# Patient Record
Sex: FEMALE | Race: WHITE | Employment: UNEMPLOYED | URBAN - METROPOLITAN AREA
[De-identification: names, ages, dates, MRNs, and addresses within clinical notes are randomized per-mention and may not be internally consistent; named-entity substitution may affect disease eponyms.]

---

## 2019-02-24 ENCOUNTER — HOSPITAL ENCOUNTER (EMERGENCY)
Age: 39
Discharge: HOME | End: 2019-02-24
Attending: EMERGENCY MEDICINE
Payer: MEDICAID

## 2019-02-24 ENCOUNTER — APPOINTMENT (OUTPATIENT)
Dept: GENERAL RADIOLOGY | Age: 39
End: 2019-02-24
Payer: MEDICAID

## 2019-02-24 VITALS
BODY MASS INDEX: 45.32 KG/M2 | SYSTOLIC BLOOD PRESSURE: 112 MMHG | TEMPERATURE: 98.4 F | HEIGHT: 60 IN | OXYGEN SATURATION: 94 % | RESPIRATION RATE: 18 BRPM | WEIGHT: 230.82 LBS | HEART RATE: 110 BPM | DIASTOLIC BLOOD PRESSURE: 70 MMHG

## 2019-02-24 DIAGNOSIS — R11.2 NON-INTRACTABLE VOMITING WITH NAUSEA, UNSPECIFIED VOMITING TYPE: ICD-10-CM

## 2019-02-24 DIAGNOSIS — J11.1 INFLUENZA WITH RESPIRATORY MANIFESTATION OTHER THAN PNEUMONIA: Primary | ICD-10-CM

## 2019-02-24 DIAGNOSIS — R07.81 RIB PAIN: ICD-10-CM

## 2019-02-24 LAB
BILIRUBIN URINE: ABNORMAL
BLOOD, URINE: NEGATIVE
CLARITY: CLEAR
COLOR: ABNORMAL
GLUCOSE URINE: NEGATIVE MG/DL
HCG(URINE) PREGNANCY TEST: NEGATIVE
KETONES, URINE: ABNORMAL MG/DL
LEUKOCYTE ESTERASE, URINE: NEGATIVE
MICROSCOPIC EXAMINATION: ABNORMAL
NITRITE, URINE: NEGATIVE
PH UA: 5
PROTEIN UA: NEGATIVE MG/DL
RAPID INFLUENZA  B AGN: NEGATIVE
RAPID INFLUENZA A AGN: POSITIVE
SPECIFIC GRAVITY UA: >=1.03
URINE REFLEX TO CULTURE: ABNORMAL
URINE TYPE: ABNORMAL
UROBILINOGEN, URINE: 1 E.U./DL

## 2019-02-24 PROCEDURE — 6370000000 HC RX 637 (ALT 250 FOR IP): Performed by: EMERGENCY MEDICINE

## 2019-02-24 PROCEDURE — 94760 N-INVAS EAR/PLS OXIMETRY 1: CPT

## 2019-02-24 PROCEDURE — 71046 X-RAY EXAM CHEST 2 VIEWS: CPT

## 2019-02-24 PROCEDURE — 87804 INFLUENZA ASSAY W/OPTIC: CPT

## 2019-02-24 PROCEDURE — 6360000002 HC RX W HCPCS: Performed by: EMERGENCY MEDICINE

## 2019-02-24 PROCEDURE — 84703 CHORIONIC GONADOTROPIN ASSAY: CPT

## 2019-02-24 PROCEDURE — 94664 DEMO&/EVAL PT USE INHALER: CPT

## 2019-02-24 PROCEDURE — 94640 AIRWAY INHALATION TREATMENT: CPT

## 2019-02-24 PROCEDURE — 81003 URINALYSIS AUTO W/O SCOPE: CPT

## 2019-02-24 PROCEDURE — 99284 EMERGENCY DEPT VISIT MOD MDM: CPT

## 2019-02-24 RX ORDER — GUAIFENESIN/DEXTROMETHORPHAN 100-10MG/5
5 SYRUP ORAL ONCE
Status: COMPLETED | OUTPATIENT
Start: 2019-02-24 | End: 2019-02-24

## 2019-02-24 RX ORDER — ALBUTEROL SULFATE 2.5 MG/3ML
5 SOLUTION RESPIRATORY (INHALATION) ONCE
Status: COMPLETED | OUTPATIENT
Start: 2019-02-24 | End: 2019-02-24

## 2019-02-24 RX ORDER — ONDANSETRON 4 MG/1
4 TABLET, ORALLY DISINTEGRATING ORAL ONCE
Status: COMPLETED | OUTPATIENT
Start: 2019-02-24 | End: 2019-02-24

## 2019-02-24 RX ORDER — NAPROXEN 250 MG/1
500 TABLET ORAL 2 TIMES DAILY WITH MEALS
Qty: 28 TABLET | Refills: 0 | Status: SHIPPED | OUTPATIENT
Start: 2019-02-24 | End: 2019-04-29 | Stop reason: ALTCHOICE

## 2019-02-24 RX ORDER — ONDANSETRON 4 MG/1
4 TABLET, ORALLY DISINTEGRATING ORAL EVERY 8 HOURS PRN
Qty: 20 TABLET | Refills: 0 | Status: SHIPPED | OUTPATIENT
Start: 2019-02-24

## 2019-02-24 RX ORDER — ALBUTEROL SULFATE 90 UG/1
2 AEROSOL, METERED RESPIRATORY (INHALATION) EVERY 4 HOURS PRN
Qty: 1 INHALER | Refills: 0 | Status: SHIPPED | OUTPATIENT
Start: 2019-02-24

## 2019-02-24 RX ORDER — GUAIFENESIN/DEXTROMETHORPHAN 100-10MG/5
5 SYRUP ORAL 3 TIMES DAILY PRN
Qty: 120 ML | Refills: 0 | Status: SHIPPED | OUTPATIENT
Start: 2019-02-24 | End: 2019-03-06

## 2019-02-24 RX ORDER — NAPROXEN 250 MG/1
500 TABLET ORAL ONCE
Status: COMPLETED | OUTPATIENT
Start: 2019-02-24 | End: 2019-02-24

## 2019-02-24 RX ADMIN — ALBUTEROL SULFATE 5 MG: 2.5 SOLUTION RESPIRATORY (INHALATION) at 15:36

## 2019-02-24 RX ADMIN — NAPROXEN 500 MG: 250 TABLET ORAL at 16:12

## 2019-02-24 RX ADMIN — ONDANSETRON 4 MG: 4 TABLET, ORALLY DISINTEGRATING ORAL at 16:09

## 2019-02-24 RX ADMIN — GUAIFENESIN AND DEXTROMETHORPHAN 5 ML: 100; 10 SYRUP ORAL at 16:04

## 2019-02-24 ASSESSMENT — PAIN DESCRIPTION - LOCATION: LOCATION: RIB CAGE

## 2019-02-24 ASSESSMENT — PAIN SCALES - GENERAL
PAINLEVEL_OUTOF10: 8
PAINLEVEL_OUTOF10: 8

## 2019-02-24 ASSESSMENT — PAIN DESCRIPTION - ORIENTATION: ORIENTATION: RIGHT

## 2019-04-29 ENCOUNTER — OFFICE VISIT (OUTPATIENT)
Dept: FAMILY MEDICINE CLINIC | Age: 39
End: 2019-04-29
Payer: MEDICAID

## 2019-04-29 VITALS
BODY MASS INDEX: 44.47 KG/M2 | OXYGEN SATURATION: 98 % | DIASTOLIC BLOOD PRESSURE: 72 MMHG | HEART RATE: 97 BPM | WEIGHT: 226.5 LBS | HEIGHT: 60 IN | TEMPERATURE: 98.1 F | SYSTOLIC BLOOD PRESSURE: 126 MMHG

## 2019-04-29 DIAGNOSIS — R11.2 NON-INTRACTABLE VOMITING WITH NAUSEA, UNSPECIFIED VOMITING TYPE: ICD-10-CM

## 2019-04-29 DIAGNOSIS — R49.0 HOARSENESS: ICD-10-CM

## 2019-04-29 DIAGNOSIS — E03.9 HYPOTHYROIDISM, UNSPECIFIED TYPE: Primary | ICD-10-CM

## 2019-04-29 DIAGNOSIS — F31.60 BIPOLAR AFFECTIVE DISORDER, CURRENT EPISODE MIXED, CURRENT EPISODE SEVERITY UNSPECIFIED (HCC): ICD-10-CM

## 2019-04-29 DIAGNOSIS — J45.51 SEVERE PERSISTENT ASTHMA WITH ACUTE EXACERBATION: ICD-10-CM

## 2019-04-29 DIAGNOSIS — R19.7 DIARRHEA, UNSPECIFIED TYPE: ICD-10-CM

## 2019-04-29 PROCEDURE — 99204 OFFICE O/P NEW MOD 45 MIN: CPT | Performed by: INTERNAL MEDICINE

## 2019-04-29 PROCEDURE — G8417 CALC BMI ABV UP PARAM F/U: HCPCS | Performed by: INTERNAL MEDICINE

## 2019-04-29 PROCEDURE — G8427 DOCREV CUR MEDS BY ELIG CLIN: HCPCS | Performed by: INTERNAL MEDICINE

## 2019-04-29 PROCEDURE — 4004F PT TOBACCO SCREEN RCVD TLK: CPT | Performed by: INTERNAL MEDICINE

## 2019-04-29 RX ORDER — LEVOTHYROXINE SODIUM 0.03 MG/1
25 TABLET ORAL DAILY
Qty: 30 TABLET | Refills: 5 | Status: SHIPPED | OUTPATIENT
Start: 2019-04-29 | End: 2019-11-25 | Stop reason: SDUPTHER

## 2019-04-29 RX ORDER — PREDNISONE 20 MG/1
TABLET ORAL
Qty: 13 TABLET | Refills: 0 | Status: SHIPPED | OUTPATIENT
Start: 2019-04-29 | End: 2019-05-09 | Stop reason: ALTCHOICE

## 2019-04-29 RX ORDER — OMEPRAZOLE 40 MG/1
40 CAPSULE, DELAYED RELEASE ORAL
Qty: 30 CAPSULE | Refills: 0 | Status: SHIPPED | OUTPATIENT
Start: 2019-04-29 | End: 2019-06-08 | Stop reason: SDUPTHER

## 2019-04-29 RX ORDER — BUDESONIDE AND FORMOTEROL FUMARATE DIHYDRATE 160; 4.5 UG/1; UG/1
2 AEROSOL RESPIRATORY (INHALATION) 2 TIMES DAILY
Qty: 1 INHALER | Refills: 5 | Status: SHIPPED | OUTPATIENT
Start: 2019-04-29

## 2019-04-29 ASSESSMENT — PATIENT HEALTH QUESTIONNAIRE - PHQ9
1. LITTLE INTEREST OR PLEASURE IN DOING THINGS: 1
SUM OF ALL RESPONSES TO PHQ QUESTIONS 1-9: 2
2. FEELING DOWN, DEPRESSED OR HOPELESS: 1
SUM OF ALL RESPONSES TO PHQ9 QUESTIONS 1 & 2: 2
SUM OF ALL RESPONSES TO PHQ QUESTIONS 1-9: 2

## 2019-04-29 NOTE — PROGRESS NOTES
Prasanth Renata   1980      Reason for visit:   Chief Complaint   Patient presents with   [facility]     Has been off of her psych meds for months    URI        HPI:  Patient presents to establish care. She reports she had the flu 2 months ago. She has felt poorly since that time. She has having intermittent episodes of emesis and diarrhea. No blood noted. No blood noted. She feels weak. Her throat is constantly sore and she feels hoarse. She has heartburn but states it is not major. No significant abdominal pain. She reports very low energy. She has a h/o asthma. She has been using her inhaler 2-3 times per day. She feels sob and wheezy. She states she has been off of a controller for a long time - does not recall the name. She reports she was at [facility] ER last week and was given IVF, steroids. She did not see much improvement with the steroids. She has a h/o hypothyroidism and is not on medication. Her tsh was 5.2 on 4/20 when in the ED. She is a current smoker ~3/4 ppd. She has a h/o anxiety and bipolar affective disorder. She reports she was on medication previously but her insurance changed and she stopped seeing them. She shala any acute issues with depression or anxiety. She knows she needs to see a psychiatrist but doesn't feel like its a major issue right now. She is interested in establishing with a new psychiatrist as she did not have a good relationship with her last doctor. She does not remember what medications she was on at that time. .     Past Medical History:   Diagnosis Date    Anxiety     Asthma     Bipolar affective ([facility] [address]     Depression     Hypothyroidism     Influenza A 02/24/2019           Current Outpatient Medications:     levothyroxine (SYNTHROID) 25 MCG tablet, Take 1 tablet by mouth daily, Disp: 30 tablet, Rfl: 5    omeprazole (PRILOSEC) 40 MG delayed release capsule, Take 1 capsule by mouth every morning (before breakfast), Disp: 30 capsule, Rfl: 0   budesonide-formoterol (SYMBICORT) 160-4.5 MCG/ACT AERO, Inhale 2 puffs into the lungs 2 times daily, Disp: 1 Inhaler, Rfl: 5    predniSONE (DELTASONE) 20 MG tablet, Please take 2 tablets x 4 days, 1 tablet x 3 days, 1/2 tablet x 3, Disp: 13 tablet, Rfl: 0    ondansetron (ZOFRAN ODT) 4 MG disintegrating tablet, Take 1 tablet by mouth every 8 hours as needed for Nausea or Vomiting, Disp: 20 tablet, Rfl: 0    albuterol sulfate HFA (PROAIR HFA) 108 (90 Base) MCG/ACT inhaler, Inhale 2 puffs into the lungs every 4 hours as needed for Wheezing, Disp: 1 Inhaler, Rfl: 0     No Known Allergies    Past Surgical History:   Procedure Laterality Date     SECTION  2007     SECTION  2009     SECTION  2010     SECTION  2013    HERNIA REPAIR      Multiple Hernia repairs    TYMPANOSTOMY TUBE PLACEMENT      As a child        Family History   Problem Relation Age of Onset    Mental Illness Mother         Social History     Socioeconomic History    Marital status:      Spouse name: Not on file    Number of children: Not on file    Years of education: Not on file    Highest education level: Not on file   Occupational History    Not on file   Social Needs    Financial resource strain: Not on file    Food insecurity:     Worry: Not on file     Inability: Not on file    Transportation needs:     Medical: Not on file     Non-medical: Not on file   Tobacco Use    Smoking status: Current Every Day Smoker     Packs/day: 1.00    Smokeless tobacco: Current User   Substance and Sexual Activity    Alcohol use: Yes     Comment: occ beer    Drug use: Not on file    Sexual activity: Not on file   Lifestyle    Physical activity:     Days per week: Not on file     Minutes per session: Not on file    Stress: Not on file   Relationships    Social connections:     Talks on phone: Not on file     Gets together: Not on file     Attends Hindu service: Not on file Active member of club or organization: Not on file     Attends meetings of clubs or organizations: Not on file     Relationship status: Not on file    Intimate partner violence:     Fear of current or ex partner: Not on file     Emotionally abused: Not on file     Physically abused: Not on file     Forced sexual activity: Not on file   Other Topics Concern    Not on file   Social History Narrative    Not on file       Review of Systems   Constitutional: Negative for chills, fever and unexpected weight change. +fatigue  HENT: Negative for congestion, ear pain,  trouble swallowing. +hoarseness    Eyes: Negative for pain and redness. Respiratory: positive for cough and shortness of breath. Cardiovascular: Negative for chest pain, palpitations and leg swelling. Gastrointestinal: Negative for abdominal pain, blood in stool, constipation, positive for diarrhea, nausea and vomiting. Endocrine: Negative for cold intolerance, heat intolerance, polydipsia and polyuria. Genitourinary: Negative for dysuria, frequency and hematuria. Musculoskeletal: Negative for arthralgias, myalgias and joint swelling. Skin: Negative for rash. Neurological: Negative for weakness, numbness and headaches. Hematological: Negative for adenopathy. Does not bruise/bleed easily. Psychiatric/Behavioral: Negative for sleep disturbance. The patient is not nervous/anxious. No report of depression    Physical Exam:  /72   Pulse 97   Temp 98.1 °F (36.7 °C) (Oral)   Ht 5' (1.524 m)   Wt 226 lb 8 oz (102.7 kg)   LMP 04/17/2019   SpO2 98%   BMI 44.24 kg/m²   Constitutional: appears well-developed and well-nourished. Head: Normocephalic and atraumatic. Eyes: Pupils are equal, round, and reactive to light.  Conjunctivae and EOM are normal.   Right Ear: External ear normal. TM normal  Left Ear: External ear normal. TM normal  Nose: Nose normal.   Mouth/Throat: Oropharynx is clear and moist. No lesions noted  Cardiovascular: Normal rate, regular rhythm and normal heart sounds. No murmur heard. Pulmonary/Chest: Effort normal. No respiratory distress. Decreased BS at bases with wheezes throughout. No crackles  Abdominal: Soft. Bowel sounds are normal. No distension. There is no tenderness. Musculoskeletal: Normal range of motion. No edema, tenderness or deformity. Lymphadenopathy: No cervical adenopathy. Neurological: alert. No cranial nerve deficit. Skin: Skin is warm and dry. Capillary refill takes less than 2 seconds. No rash noted. Psychiatric: Normal mood and affect. Assessment and Plan: Jakob Chapa is a 41 y.o. female presenting today to [facility]. Diagnoses and all orders for this visit:    Patient has quite a lot going on at present. I reviewed her recent records from [facility]. She had normal blood work and a normal chest x-ray. Her TSH was elevated and thus, recommend resuming her Synthroid as this may be contributing to some of her current symptomatology. She will see gastroenterology for evaluation of her ongoing vomiting, nausea, diarrhea. She may use Zofran as needed. Id also like to have her see ENT for the new hoarseness that has been ongoing for 2 months now. We will treat her ongoing asthma exacerbation. She will take a steroid taper and start a controller. She may continue to use the rescue inhaler as needed. She is advised to seek medical attention for any new or worsening symptoms in the interim but we will see her in close follow-up to follow this to resolution. She has been referred for psychiatry services but shell also check with her insurance regarding covered physicians. Mood is stable at this time but she needs ongoing chronic medication management.     Hypothyroidism, unspecified type    Non-intractable vomiting with nausea, unspecified vomiting type  -     [facility] - Consuelo Perez MD, Gastroenterology, [facility]    Diarrhea, unspecified type  -     [facility] - Sara Castro MD, Gastroenterology, [address], Otolaryngology, [place]    Severe persistent asthma with acute exacerbation  -     External Referral to Psychiatry    Bipolar affective disorder, current episode mixed, current episode severity unspecified ([facility]    Other orders  -     levothyroxine (SYNTHROID) 25 MCG tablet; Take 1 tablet by mouth daily  -     omeprazole (PRILOSEC) 40 MG delayed release capsule; Take 1 capsule by mouth every morning (before breakfast)  -     budesonide-formoterol (SYMBICORT) 160-4.5 MCG/ACT AERO; Inhale 2 puffs into the lungs 2 times daily  -     predniSONE (DELTASONE) 20 MG tablet; Please take 2 tablets x 4 days, 1 tablet x 3 days, 1/2 tablet x 3      Return to clinic in 1-2 weeks. Lillian Vergara M.D.   Internal Medicine and Pediatrics  [facility]

## 2019-05-01 ENCOUNTER — TELEPHONE (OUTPATIENT)
Dept: INTERNAL MEDICINE CLINIC | Age: 39
End: 2019-05-01

## 2019-05-03 NOTE — TELEPHONE ENCOUNTER
Received DENIAL for Symbicort 160-4. 5MCG/ACT aerosol. Denial letter attached. Please advise patient. Thank you.

## 2019-05-06 RX ORDER — FLUTICASONE PROPIONATE AND SALMETEROL 113; 14 UG/1; UG/1
1 POWDER, METERED RESPIRATORY (INHALATION) 2 TIMES DAILY
Qty: 1 EACH | Refills: 5 | Status: SHIPPED | OUTPATIENT
Start: 2019-05-06

## 2019-05-09 ENCOUNTER — OFFICE VISIT (OUTPATIENT)
Dept: FAMILY MEDICINE CLINIC | Age: 39
End: 2019-05-09
Payer: MEDICAID

## 2019-05-09 VITALS
OXYGEN SATURATION: 97 % | SYSTOLIC BLOOD PRESSURE: 130 MMHG | HEIGHT: 60 IN | DIASTOLIC BLOOD PRESSURE: 82 MMHG | WEIGHT: 232.13 LBS | HEART RATE: 102 BPM | BODY MASS INDEX: 45.57 KG/M2

## 2019-05-09 DIAGNOSIS — F31.60 BIPOLAR AFFECTIVE DISORDER, CURRENT EPISODE MIXED, CURRENT EPISODE SEVERITY UNSPECIFIED (HCC): ICD-10-CM

## 2019-05-09 DIAGNOSIS — R49.0 HOARSENESS: ICD-10-CM

## 2019-05-09 DIAGNOSIS — J45.51 SEVERE PERSISTENT ASTHMA WITH ACUTE EXACERBATION: Primary | ICD-10-CM

## 2019-05-09 DIAGNOSIS — R11.2 NON-INTRACTABLE VOMITING WITH NAUSEA, UNSPECIFIED VOMITING TYPE: ICD-10-CM

## 2019-05-09 PROCEDURE — G8417 CALC BMI ABV UP PARAM F/U: HCPCS | Performed by: INTERNAL MEDICINE

## 2019-05-09 PROCEDURE — 99214 OFFICE O/P EST MOD 30 MIN: CPT | Performed by: INTERNAL MEDICINE

## 2019-05-09 PROCEDURE — 4004F PT TOBACCO SCREEN RCVD TLK: CPT | Performed by: INTERNAL MEDICINE

## 2019-05-09 PROCEDURE — G8427 DOCREV CUR MEDS BY ELIG CLIN: HCPCS | Performed by: INTERNAL MEDICINE

## 2019-05-09 RX ORDER — ARIPIPRAZOLE 5 MG/1
10 TABLET ORAL DAILY
Qty: 60 TABLET | Refills: 3 | Status: SHIPPED | OUTPATIENT
Start: 2019-05-09 | End: 2019-07-18 | Stop reason: SDUPTHER

## 2019-05-09 ASSESSMENT — ENCOUNTER SYMPTOMS
DIARRHEA: 0
SHORTNESS OF BREATH: 1
EYE REDNESS: 0
EYE DISCHARGE: 0
COUGH: 0
ABDOMINAL PAIN: 0
SINUS PAIN: 0
NAUSEA: 1
VOMITING: 1
SORE THROAT: 0
SINUS PRESSURE: 0
VOICE CHANGE: 1
WHEEZING: 0
RHINORRHEA: 0

## 2019-05-09 NOTE — PROGRESS NOTES
tablets by mouth daily Yes Cole Muniz MD   Fluticasone-Salmeterol 113-14 MCG/ACT AEPB Inhale 1 puff into the lungs 2 times daily Yes Cole Muniz MD   levothyroxine (SYNTHROID) 25 MCG tablet Take 1 tablet by mouth daily Yes Cole Muniz MD   omeprazole (PRILOSEC) 40 MG delayed release capsule Take 1 capsule by mouth every morning (before breakfast) Yes Cole Muniz MD   budesonide-formoterol (SYMBICORT) 160-4.5 MCG/ACT AERO Inhale 2 puffs into the lungs 2 times daily Yes Cole Muniz MD   ondansetron (ZOFRAN ODT) 4 MG disintegrating tablet Take 1 tablet by mouth every 8 hours as needed for Nausea or Vomiting Yes Dwayne Sales MD   albuterol sulfate HFA (PROAIR HFA) 108 (90 Base) MCG/ACT inhaler Inhale 2 puffs into the lungs every 4 hours as needed for Wheezing Yes Dwayne Sales MD        Social History     Tobacco Use    Smoking status: Current Every Day Smoker     Packs/day: 1.00    Smokeless tobacco: Current User   Substance Use Topics    Alcohol use: Yes     Comment: occ melvi        Vitals:    05/09/19 1002   BP: 130/82   Pulse: 102   SpO2: 97%   Weight: 232 lb 2 oz (105.3 kg)   Height: 5' (1.524 m)     Estimated body mass index is 45.33 kg/m² as calculated from the following:    Height as of this encounter: 5' (1.524 m). Weight as of this encounter: 232 lb 2 oz (105.3 kg). Physical Exam   Constitutional: She appears well-developed and well-nourished. No distress. HENT:   Head: Normocephalic and atraumatic. Neck: Neck supple. Cardiovascular: Normal rate, regular rhythm and normal heart sounds. No murmur heard. Pulmonary/Chest: Effort normal and breath sounds normal. No respiratory distress. She has no wheezes. She has no rales. Abdominal: Soft. Bowel sounds are normal. There is no tenderness. Lymphadenopathy:     She has no cervical adenopathy. Skin: Skin is warm and dry. Capillary refill takes less than 2 seconds. No rash noted.    Psychiatric: Judgment and thought content normal.       ASSESSMENT/PLAN:  1. Non-intractable vomiting with nausea, unspecified vomiting type  She will continue PPI and Zofran as needed. She'll keep appointment with gastroenterology. She is advised to notify me for any new or worsening symptoms. 2. Hoarseness  Improving, however, not completely resolved. She will see ENT as planned    3. Severe persistent asthma with acute exacerbation  Much improved since last visit. She'll notify me for any recurrent symptoms. I urged her to  the controller medication. Close follow-up scheduled to ensure that she continues to improve with addition of controller . She'll call me or seek medical attention for any recurrent symptoms of an exacerbation. 4. Bipolar affective disorder, current episode mixed, current episode severity unspecified ([facility] [address]  We will resume her Abilify as she was previously doing well on this, however, she will also establish with Dr. Carmelita Mart      Return in about 1 month (around 6/6/2019) for 30 min appt for pap and followup. An electronic signature was used to authenticate this note.     --Tuan Kang MD on 5/9/2019 at 10:35 AM

## 2019-05-20 ENCOUNTER — TELEPHONE (OUTPATIENT)
Dept: FAMILY MEDICINE CLINIC | Age: 39
End: 2019-05-20

## 2019-05-20 RX ORDER — PREDNISONE 20 MG/1
TABLET ORAL
Qty: 10 TABLET | Refills: 0 | Status: SHIPPED | OUTPATIENT
Start: 2019-05-20 | End: 2021-03-24

## 2019-05-20 NOTE — TELEPHONE ENCOUNTER
pls clarify with pt - is her asthma flared up or just the hoarseness?  If just hoarseness, recommend seeing the ENT

## 2019-05-20 NOTE — TELEPHONE ENCOUNTER
I know the prednisone helped her. I've sent in one more prescription.  She needs to keep ent appt and get the controller prescription if she hasnt already

## 2019-05-20 NOTE — TELEPHONE ENCOUNTER
Spoke to patient , has appt on 5.23.19 with ENT.   Would like to know if there is anything that can be called in or OTC that she can take until she see's ENT this Thursday

## 2019-05-23 ENCOUNTER — OFFICE VISIT (OUTPATIENT)
Dept: ENT CLINIC | Age: 39
End: 2019-05-23
Payer: MEDICAID

## 2019-05-23 VITALS
BODY MASS INDEX: 44.89 KG/M2 | HEART RATE: 103 BPM | WEIGHT: 237.8 LBS | TEMPERATURE: 98.5 F | HEIGHT: 61 IN | SYSTOLIC BLOOD PRESSURE: 118 MMHG | DIASTOLIC BLOOD PRESSURE: 78 MMHG

## 2019-05-23 DIAGNOSIS — R49.0 DYSPHONIA: Primary | ICD-10-CM

## 2019-05-23 DIAGNOSIS — K21.9 LARYNGOPHARYNGEAL REFLUX (LPR): ICD-10-CM

## 2019-05-23 PROCEDURE — G8427 DOCREV CUR MEDS BY ELIG CLIN: HCPCS | Performed by: OTOLARYNGOLOGY

## 2019-05-23 PROCEDURE — G8417 CALC BMI ABV UP PARAM F/U: HCPCS | Performed by: OTOLARYNGOLOGY

## 2019-05-23 PROCEDURE — 4004F PT TOBACCO SCREEN RCVD TLK: CPT | Performed by: OTOLARYNGOLOGY

## 2019-05-23 PROCEDURE — 99203 OFFICE O/P NEW LOW 30 MIN: CPT | Performed by: OTOLARYNGOLOGY

## 2019-05-23 PROCEDURE — 31575 DIAGNOSTIC LARYNGOSCOPY: CPT | Performed by: OTOLARYNGOLOGY

## 2019-05-23 RX ORDER — RANITIDINE 300 MG/1
300 TABLET ORAL NIGHTLY
Qty: 30 TABLET | Refills: 3 | Status: SHIPPED | OUTPATIENT
Start: 2019-05-23

## 2019-05-23 ASSESSMENT — ENCOUNTER SYMPTOMS
CONSTIPATION: 0
SINUS PRESSURE: 0
COUGH: 0
COLOR CHANGE: 0
NAUSEA: 0
VOMITING: 0
FACIAL SWELLING: 0
STRIDOR: 0
BACK PAIN: 0
CHOKING: 0
WHEEZING: 0
BLOOD IN STOOL: 0
SHORTNESS OF BREATH: 0
RHINORRHEA: 0
TROUBLE SWALLOWING: 0
EYE DISCHARGE: 0
VOICE CHANGE: 1
PHOTOPHOBIA: 0
DIARRHEA: 0
SINUS PAIN: 0
SORE THROAT: 0
EYE ITCHING: 0

## 2019-05-23 NOTE — PROGRESS NOTES
[facility]  [address] [address] [facility] [address] [facility] Dina Patel  P: [phone]  F: [phone]       Patient     Kaitlin Jara  1980    ChiefComplaint     Chief Complaint   Patient presents with    Laryngitis     has had for months now throat gets dry and somethimes her left ear has drainage        History of Present Illness     Migue Mejia is a pleasant 56-year-old female who presents as a new patient today for hoarseness. Patient has had hoarseness of her voice for the past 2-3 months. She states it is occurred on multiple occasions. There have been times where it has gotten better with steroids, but then the symptoms returned. This most recent episode began in the last couple weeks. She's having periods where she can barely talk. Her voice is very breathy. She does have some pain in her throat. She experiences dysphagia and odynophagia as well. She does states she has been having significant episodes of emesis recently. These have improved since being on Prilosec. Denies any aspiration symptoms. She does admit to some coughing. Denies hemoptysis. She is a former smoker approximately 20-25 pack years. She quit 1 month ago. She does admit to some reflux symptoms. She is taking Prilosec at this time.     Past Medical History     Past Medical History:   Diagnosis Date    Anxiety     Asthma     Bipolar affective ([address]     Depression     Fracture of nasal bone     GERD (gastroesophageal reflux disease)     Hypothyroidism     Influenza A 2019    Recurrent upper respiratory infection (URI)     Tinnitus        Past Surgical History     Past Surgical History:   Procedure Laterality Date     SECTION  2007     SECTION  2009     SECTION  2010     SECTION  2013    HERNIA REPAIR      Multiple Hernia repairs    TYMPANOSTOMY TUBE PLACEMENT      As a child       Family History     Family History   Problem Relation Age of Onset    Mental Illness Mother        Social History     Social History     Socioeconomic History    Marital status:      Spouse name: Not on file    Number of children: Not on file    Years of education: Not on file    Highest education level: Not on file   Occupational History    Not on file   Social Needs    Financial resource strain: Not on file    Food insecurity:     Worry: Not on file     Inability: Not on file    Transportation needs:     Medical: Not on file     Non-medical: Not on file   Tobacco Use    Smoking status: Current Every Day Smoker     Packs/day: 1.00    Smokeless tobacco: Current User    Tobacco comment: smoking a e cig now    Substance and Sexual Activity    Alcohol use: Yes     Comment: occ beer    Drug use: Not on file    Sexual activity: Not on file   Lifestyle    Physical activity:     Days per week: Not on file     Minutes per session: Not on file    Stress: Not on file   Relationships    Social connections:     Talks on phone: Not on file     Gets together: Not on file     Attends Yazdanism service: Not on file     Active member of club or organization: Not on file     Attends meetings of clubs or organizations: Not on file     Relationship status: Not on file    Intimate partner violence:     Fear of current or ex partner: Not on file     Emotionally abused: Not on file     Physically abused: Not on file     Forced sexual activity: Not on file   Other Topics Concern    Not on file   Social History Narrative    Not on file       Allergies     Allergies   Allergen Reactions    Penicillins Other (See Comments) and Rash    Iv Dye [Iodides] Rash       Medications     Current Outpatient Medications   Medication Sig Dispense Refill    ranitidine (ZANTAC) 300 MG tablet Take 1 tablet by mouth nightly 30 tablet 3    predniSONE (DELTASONE) 20 MG tablet Please take 2 tablets x 5 days 10 tablet 0    ARIPiprazole (ABILIFY) 5 MG tablet Take 2 tablets by mouth daily 60 tablet 3    Fluticasone-Salmeterol 113-14 MCG/ACT AEPB Inhale 1 puff into the lungs 2 times daily 1 each 5    levothyroxine (SYNTHROID) 25 MCG tablet Take 1 tablet by mouth daily 30 tablet 5    omeprazole (PRILOSEC) 40 MG delayed release capsule Take 1 capsule by mouth every morning (before breakfast) 30 capsule 0    budesonide-formoterol (SYMBICORT) 160-4.5 MCG/ACT AERO Inhale 2 puffs into the lungs 2 times daily 1 Inhaler 5    ondansetron (ZOFRAN ODT) 4 MG disintegrating tablet Take 1 tablet by mouth every 8 hours as needed for Nausea or Vomiting 20 tablet 0    albuterol sulfate HFA (PROAIR HFA) 108 (90 Base) MCG/ACT inhaler Inhale 2 puffs into the lungs every 4 hours as needed for Wheezing 1 Inhaler 0     No current facility-administered medications for this visit. Review of Systems     Review of Systems   Constitutional: Negative for activity change, appetite change, chills, diaphoresis, fatigue, fever and unexpected weight change. HENT: Positive for voice change. Negative for congestion, dental problem, drooling, ear discharge, ear pain, facial swelling, hearing loss, mouth sores, nosebleeds, postnasal drip, rhinorrhea, sinus pressure, sinus pain, sneezing, sore throat, tinnitus and trouble swallowing. Eyes: Negative for photophobia, discharge, itching and visual disturbance. Respiratory: Negative for cough, choking, shortness of breath, wheezing and stridor. Gastrointestinal: Negative for blood in stool, constipation, diarrhea, nausea and vomiting. Endocrine: Negative for cold intolerance, heat intolerance, polyphagia and polyuria. Musculoskeletal: Negative for back pain, gait problem, neck pain and neck stiffness. Skin: Negative for color change, pallor, rash and wound. Neurological: Negative for dizziness, syncope, facial asymmetry, speech difficulty, light-headedness, numbness and headaches. Hematological: Negative for adenopathy. Does not bruise/bleed easily. Psychiatric/Behavioral: Negative for agitation, confusion and sleep disturbance. PhysicalExam     Vitals:    05/23/19 0931   BP: 118/78   Pulse: 103   Temp: 98.5 °F (36.9 °C)       Physical Exam   Constitutional: She is oriented to person, place, and time. She appears well-developed and well-nourished. HENT:   Head: Normocephalic and atraumatic. Not macrocephalic and not microcephalic. Head is without raccoon's eyes, without Vincent's sign, without abrasion, without contusion, without laceration, without right periorbital erythema and without left periorbital erythema. Hair is normal.   Right Ear: Hearing, tympanic membrane and external ear normal. No drainage, swelling or tenderness. No mastoid tenderness. Tympanic membrane is not perforated, not retracted and not bulging. Tympanic membrane mobility is normal. No middle ear effusion. No decreased hearing is noted. Left Ear: Hearing, tympanic membrane and external ear normal. No drainage, swelling or tenderness. No mastoid tenderness. Tympanic membrane is not perforated, not retracted and not bulging. Tympanic membrane mobility is normal.  No middle ear effusion. No decreased hearing is noted. Nose: No mucosal edema, rhinorrhea, nose lacerations, sinus tenderness, nasal deformity, septal deviation or nasal septal hematoma. No epistaxis. No foreign bodies. Right sinus exhibits no maxillary sinus tenderness and no frontal sinus tenderness. Left sinus exhibits no maxillary sinus tenderness and no frontal sinus tenderness. Mouth/Throat: Uvula is midline and oropharynx is clear and moist. Mucous membranes are not pale, not dry and not cyanotic. No oral lesions. No trismus in the jaw. Normal dentition. No dental abscesses, uvula swelling, lacerations or dental caries. No oropharyngeal exudate, posterior oropharyngeal edema, posterior oropharyngeal erythema or tonsillar abscesses.    Voice is breathy and weak   Eyes: Lids are normal. Right eye exhibits no chemosis, no discharge and no exudate. Left eye exhibits no chemosis, no discharge and no exudate. Right eye exhibits normal extraocular motion and no nystagmus. Left eye exhibits normal extraocular motion and no nystagmus. Neck: Neck supple. Normal carotid pulses present. No tracheal tenderness present. No tracheal deviation present. No thyroid mass and no thyromegaly present. Cardiovascular: Normal rate and regular rhythm. Pulmonary/Chest: No stridor. No apnea, no tachypnea and no bradypnea. No respiratory distress. Musculoskeletal:        Right shoulder: She exhibits normal range of motion. Lymphadenopathy:        Head (right side): No submental, no submandibular, no tonsillar, no preauricular, no posterior auricular and no occipital adenopathy present. Head (left side): No submental, no submandibular, no tonsillar, no preauricular, no posterior auricular and no occipital adenopathy present. She has no cervical adenopathy. Right cervical: No superficial cervical, no deep cervical and no posterior cervical adenopathy present. Left cervical: No superficial cervical, no deep cervical and no posterior cervical adenopathy present. Neurological: She is alert and oriented to person, place, and time. No cranial nerve deficit. Skin: Skin is warm and dry. No bruising, no laceration, no lesion and no rash noted. No erythema. Psychiatric: She has a normal mood and affect. Her speech is normal and behavior is normal.         Procedure     Flexible Laryngoscopy    Pre op: hoarseness. Procedure : Flexible Laryngoscopy  Surgeon: Glenna Lopes DO  Anesthesia: Afrin with 4% lidocaine  Indication: Laryngeal mirror examination was not tolerated due to gag reflex  Description:  The scope was passed along the floor of the right naris to the level of the larynx.  There was no evidence of concerning masses or lesions of the base of tongue, vallecula, epiglottis, aryepiglottic folds, arytenoids, false vocal folds, true vocal folds, or pyriform sinuses. True vocal folds exhibited symmetric motion bilaterally without evidence of paralysis or paresis. The scope was removed. The patient tolerated the procedure without difficulty. There were no complications. Pertinent findings: Significant postcricoid edema and interarytenoid granulation tissue formation. Assessment and Plan     1. Dysphonia  [place]ell laryngoscopy reveals no evidence of concerning tumors or masses. She does have significant postcricoid edema and interarytenoid granulation tissue consistent with severe laryngal fragile reflux and irritation from her episodes of emesis. I discussed antireflux measures including not eating 3 hours before bedtime. Additionally I would like her to take her Prilosec 30 minutes before dinner. I will also going to add ranitidine at bedtime. I like to see her back in 1 month to assess improvement. She does have an appointment with the GI doctor next week to discuss her frequent episodes of emesis and GI issues. 2. Laryngopharyngeal reflux (LPR)  - ranitidine (ZANTAC) 300 MG tablet; Take 1 tablet by mouth nightly  Dispense: 30 tablet; Refill: 3      Return in about 1 month (around 6/20/2019). Portions of this note were dictated using Dragon.  There may be linguistic errors secondary to the use of this program.

## 2019-06-07 DIAGNOSIS — K21.9 GASTROESOPHAGEAL REFLUX DISEASE WITHOUT ESOPHAGITIS: Primary | ICD-10-CM

## 2019-06-08 RX ORDER — OMEPRAZOLE 40 MG/1
CAPSULE, DELAYED RELEASE ORAL
Qty: 30 CAPSULE | Refills: 5 | Status: SHIPPED | OUTPATIENT
Start: 2019-06-08 | End: 2020-05-11

## 2019-07-17 ENCOUNTER — TELEPHONE (OUTPATIENT)
Dept: ORTHOPEDIC SURGERY | Age: 39
End: 2019-07-17

## 2019-07-18 ENCOUNTER — OFFICE VISIT (OUTPATIENT)
Dept: PSYCHIATRY | Age: 39
End: 2019-07-18
Payer: MEDICAID

## 2019-07-18 VITALS
HEIGHT: 60 IN | BODY MASS INDEX: 46.53 KG/M2 | SYSTOLIC BLOOD PRESSURE: 138 MMHG | WEIGHT: 237 LBS | DIASTOLIC BLOOD PRESSURE: 88 MMHG | HEART RATE: 79 BPM

## 2019-07-18 DIAGNOSIS — F39 MOOD DISORDER (HCC): Primary | ICD-10-CM

## 2019-07-18 LAB
CHOLESTEROL, TOTAL: 165 MG/DL (ref 0–199)
HDLC SERPL-MCNC: 45 MG/DL (ref 40–60)
LDL CHOLESTEROL CALCULATED: 86 MG/DL
TRIGL SERPL-MCNC: 169 MG/DL (ref 0–150)
TSH REFLEX: 3.27 UIU/ML (ref 0.27–4.2)
VLDLC SERPL CALC-MCNC: 34 MG/DL

## 2019-07-18 PROCEDURE — 99203 OFFICE O/P NEW LOW 30 MIN: CPT | Performed by: PSYCHIATRY & NEUROLOGY

## 2019-07-18 PROCEDURE — 4004F PT TOBACCO SCREEN RCVD TLK: CPT | Performed by: PSYCHIATRY & NEUROLOGY

## 2019-07-18 PROCEDURE — G8427 DOCREV CUR MEDS BY ELIG CLIN: HCPCS | Performed by: PSYCHIATRY & NEUROLOGY

## 2019-07-18 PROCEDURE — G8417 CALC BMI ABV UP PARAM F/U: HCPCS | Performed by: PSYCHIATRY & NEUROLOGY

## 2019-07-18 RX ORDER — ARIPIPRAZOLE 15 MG/1
15 TABLET ORAL DAILY
Qty: 90 TABLET | Refills: 1 | Status: SHIPPED | OUTPATIENT
Start: 2019-07-18 | End: 2020-03-23 | Stop reason: SDUPTHER

## 2019-07-18 RX ORDER — ARIPIPRAZOLE 10 MG/1
10 TABLET ORAL DAILY
Qty: 90 TABLET | Refills: 1 | Status: SHIPPED | OUTPATIENT
Start: 2019-07-18 | End: 2019-07-18 | Stop reason: SDUPTHER

## 2019-07-18 RX ORDER — TRAZODONE HYDROCHLORIDE 50 MG/1
TABLET ORAL
Qty: 90 TABLET | Refills: 1 | Status: SHIPPED | OUTPATIENT
Start: 2019-07-18 | End: 2020-03-23 | Stop reason: SDUPTHER

## 2019-07-18 ASSESSMENT — ENCOUNTER SYMPTOMS
CHEST TIGHTNESS: 0
SHORTNESS OF BREATH: 0
NAUSEA: 0
DIARRHEA: 0
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
## 2019-07-19 LAB
ESTIMATED AVERAGE GLUCOSE: 99.7 MG/DL
HBA1C MFR BLD: 5.1 %

## 2019-08-29 ENCOUNTER — HOSPITAL ENCOUNTER (OUTPATIENT)
Dept: PULMONOLOGY | Age: 39
Discharge: HOME | End: 2019-08-29
Payer: MEDICAID

## 2019-08-29 PROCEDURE — 94060 EVALUATION OF WHEEZING: CPT

## 2019-08-29 PROCEDURE — 6370000000 HC RX 637 (ALT 250 FOR IP): Performed by: FAMILY MEDICINE

## 2019-08-29 PROCEDURE — 94060 EVALUATION OF WHEEZING: CPT | Performed by: INTERNAL MEDICINE

## 2019-08-29 PROCEDURE — 94640 AIRWAY INHALATION TREATMENT: CPT

## 2019-08-29 PROCEDURE — 94726 PLETHYSMOGRAPHY LUNG VOLUMES: CPT | Performed by: INTERNAL MEDICINE

## 2019-08-29 PROCEDURE — 94729 DIFFUSING CAPACITY: CPT

## 2019-08-29 PROCEDURE — 94726 PLETHYSMOGRAPHY LUNG VOLUMES: CPT

## 2019-08-29 PROCEDURE — 94729 DIFFUSING CAPACITY: CPT | Performed by: INTERNAL MEDICINE

## 2019-08-29 RX ORDER — ALBUTEROL SULFATE 90 UG/1
4 AEROSOL, METERED RESPIRATORY (INHALATION) ONCE
Status: COMPLETED | OUTPATIENT
Start: 2019-08-29 | End: 2019-08-29

## 2019-08-29 RX ADMIN — ALBUTEROL SULFATE 4 PUFF: 90 AEROSOL, METERED RESPIRATORY (INHALATION) at 10:39

## 2019-08-30 LAB
DLCO %PRED: 97 %
DLCO PRED: NORMAL ML/MIN/MMHG
DLCO/VA %PRED: NORMAL %
DLCO/VA PRED: NORMAL ML/MIN/MMHG
DLCO/VA: NORMAL ML/MIN/MMHG
DLCO: NORMAL ML/MIN/MMHG
EXPIRATORY TIME: NORMAL SEC
FEF 25-75% %PRED-PRE: NORMAL L/SEC
FEF 25-75% PRED: NORMAL L/SEC
FEF 25-75%-PRE: NORMAL L/SEC
FEV1 %PRED-PRE: 39 %
FEV1 PRED: NORMAL L
FEV1/FVC %PRED-PRE: NORMAL %
FEV1/FVC PRED: NORMAL %
FEV1/FVC: 62 %
FEV1: NORMAL L
FVC %PRED-PRE: NORMAL %
FVC PRED: NORMAL L
FVC: NORMAL L
GAW %PRED: NORMAL %
GAW PRED: NORMAL L/S/CMH2O
GAW: NORMAL L/S/CMH2O
IC %PRED: NORMAL %
IC PRED: NORMAL L
IC: NORMAL L
MVV %PRED-PRE: NORMAL %
MVV PRED: NORMAL L/MIN
MVV-PRE: NORMAL L/MIN
PEF %PRED-PRE: NORMAL L/SEC
PEF PRED: NORMAL L/SEC
PEF-PRE: NORMAL L/SEC
RAW %PRED: NORMAL %
RAW PRED: NORMAL CMH2O/L/S
RAW: NORMAL CMH2O/L/S
RV %PRED: NORMAL %
RV PRED: NORMAL L
RV: NORMAL L
SVC %PRED: NORMAL %
SVC PRED: NORMAL L
SVC: NORMAL L
TLC %PRED: 108 %
TLC PRED: NORMAL L
TLC: NORMAL L
VA %PRED: NORMAL %
VA PRED: NORMAL L
VA: NORMAL L
VTG %PRED: NORMAL %
VTG PRED: NORMAL L
VTG: NORMAL L

## 2019-08-30 ASSESSMENT — PULMONARY FUNCTION TESTS
FEV1_PERCENT_PREDICTED_PRE: 39
FEV1/FVC: 62

## 2019-09-12 ENCOUNTER — OFFICE VISIT (OUTPATIENT)
Dept: PSYCHIATRY | Age: 39
End: 2019-09-12
Payer: MEDICAID

## 2019-09-12 VITALS
BODY MASS INDEX: 44.56 KG/M2 | SYSTOLIC BLOOD PRESSURE: 128 MMHG | HEIGHT: 61 IN | HEART RATE: 58 BPM | WEIGHT: 236 LBS | DIASTOLIC BLOOD PRESSURE: 90 MMHG

## 2019-09-12 DIAGNOSIS — F39 MOOD DISORDER (HCC): Primary | ICD-10-CM

## 2019-09-12 PROCEDURE — 99214 OFFICE O/P EST MOD 30 MIN: CPT | Performed by: PSYCHIATRY & NEUROLOGY

## 2019-09-12 PROCEDURE — G8427 DOCREV CUR MEDS BY ELIG CLIN: HCPCS | Performed by: PSYCHIATRY & NEUROLOGY

## 2019-09-12 PROCEDURE — 4004F PT TOBACCO SCREEN RCVD TLK: CPT | Performed by: PSYCHIATRY & NEUROLOGY

## 2019-09-12 PROCEDURE — G8417 CALC BMI ABV UP PARAM F/U: HCPCS | Performed by: PSYCHIATRY & NEUROLOGY

## 2019-09-12 RX ORDER — LEVOMEFOLATE CALCIUM 15 MG
15 TABLET ORAL DAILY
Qty: 30 TABLET | Refills: 2 | Status: SHIPPED | OUTPATIENT
Start: 2019-09-12 | End: 2020-09-21 | Stop reason: SDUPTHER

## 2019-09-12 RX ORDER — DESVENLAFAXINE 50 MG/1
50 TABLET, EXTENDED RELEASE ORAL DAILY
Qty: 30 TABLET | Refills: 3 | Status: SHIPPED | OUTPATIENT
Start: 2019-09-12 | End: 2020-03-23 | Stop reason: SDUPTHER

## 2019-09-13 ENCOUNTER — TELEPHONE (OUTPATIENT)
Dept: ORTHOPEDIC SURGERY | Age: 39
End: 2019-09-13

## 2019-11-25 DIAGNOSIS — E03.9 HYPOTHYROIDISM, UNSPECIFIED TYPE: Primary | ICD-10-CM

## 2019-11-25 RX ORDER — LEVOTHYROXINE SODIUM 0.03 MG/1
TABLET ORAL
Qty: 30 TABLET | Refills: 4 | Status: SHIPPED | OUTPATIENT
Start: 2019-11-25 | End: 2020-06-18

## 2020-02-04 ENCOUNTER — APPOINTMENT (OUTPATIENT)
Dept: CT IMAGING | Age: 40
End: 2020-02-04
Payer: MEDICAID

## 2020-02-04 ENCOUNTER — HOSPITAL ENCOUNTER (EMERGENCY)
Age: 40
Discharge: HOME | End: 2020-02-04
Payer: MEDICAID

## 2020-02-04 VITALS
WEIGHT: 231.92 LBS | RESPIRATION RATE: 21 BRPM | OXYGEN SATURATION: 96 % | HEIGHT: 61 IN | BODY MASS INDEX: 43.79 KG/M2 | SYSTOLIC BLOOD PRESSURE: 141 MMHG | TEMPERATURE: 98.3 F | DIASTOLIC BLOOD PRESSURE: 88 MMHG | HEART RATE: 106 BPM

## 2020-02-04 LAB
ANION GAP SERPL CALCULATED.3IONS-SCNC: 11 MMOL/L (ref 3–16)
BASOPHILS ABSOLUTE: 0 K/UL (ref 0–0.2)
BASOPHILS RELATIVE PERCENT: 0.6 %
BUN BLDV-MCNC: 10 MG/DL (ref 7–20)
CALCIUM SERPL-MCNC: 9.5 MG/DL (ref 8.3–10.6)
CHLORIDE BLD-SCNC: 100 MMOL/L (ref 99–110)
CO2: 30 MMOL/L (ref 21–32)
CREAT SERPL-MCNC: 0.6 MG/DL (ref 0.6–1.1)
EOSINOPHILS ABSOLUTE: 0.3 K/UL (ref 0–0.6)
EOSINOPHILS RELATIVE PERCENT: 4.3 %
GFR AFRICAN AMERICAN: >60
GFR NON-AFRICAN AMERICAN: >60
GLUCOSE BLD-MCNC: 91 MG/DL (ref 70–99)
HCT VFR BLD CALC: 39.8 % (ref 36–48)
HEMOGLOBIN: 13.3 G/DL (ref 12–16)
LACTIC ACID, SEPSIS: 1.2 MMOL/L (ref 0.4–1.9)
LYMPHOCYTES ABSOLUTE: 2 K/UL (ref 1–5.1)
LYMPHOCYTES RELATIVE PERCENT: 26.1 %
MCH RBC QN AUTO: 30.3 PG (ref 26–34)
MCHC RBC AUTO-ENTMCNC: 33.3 G/DL (ref 31–36)
MCV RBC AUTO: 90.9 FL (ref 80–100)
MONOCYTES ABSOLUTE: 0.6 K/UL (ref 0–1.3)
MONOCYTES RELATIVE PERCENT: 7.7 %
NEUTROPHILS ABSOLUTE: 4.8 K/UL (ref 1.7–7.7)
NEUTROPHILS RELATIVE PERCENT: 61.3 %
PDW BLD-RTO: 13.3 % (ref 12.4–15.4)
PLATELET # BLD: 215 K/UL (ref 135–450)
PMV BLD AUTO: 7.8 FL (ref 5–10.5)
POTASSIUM REFLEX MAGNESIUM: 3.8 MMOL/L (ref 3.5–5.1)
RBC # BLD: 4.38 M/UL (ref 4–5.2)
SODIUM BLD-SCNC: 141 MMOL/L (ref 136–145)
WBC # BLD: 7.8 K/UL (ref 4–11)

## 2020-02-04 PROCEDURE — 83605 ASSAY OF LACTIC ACID: CPT

## 2020-02-04 PROCEDURE — 6360000002 HC RX W HCPCS: Performed by: NURSE PRACTITIONER

## 2020-02-04 PROCEDURE — 2580000003 HC RX 258: Performed by: NURSE PRACTITIONER

## 2020-02-04 PROCEDURE — 2500000003 HC RX 250 WO HCPCS: Performed by: NURSE PRACTITIONER

## 2020-02-04 PROCEDURE — 96375 TX/PRO/DX INJ NEW DRUG ADDON: CPT

## 2020-02-04 PROCEDURE — 96365 THER/PROPH/DIAG IV INF INIT: CPT

## 2020-02-04 PROCEDURE — 70486 CT MAXILLOFACIAL W/O DYE: CPT

## 2020-02-04 PROCEDURE — 87040 BLOOD CULTURE FOR BACTERIA: CPT

## 2020-02-04 PROCEDURE — 80048 BASIC METABOLIC PNL TOTAL CA: CPT

## 2020-02-04 PROCEDURE — 85025 COMPLETE CBC W/AUTO DIFF WBC: CPT

## 2020-02-04 PROCEDURE — 36415 COLL VENOUS BLD VENIPUNCTURE: CPT

## 2020-02-04 PROCEDURE — 99283 EMERGENCY DEPT VISIT LOW MDM: CPT

## 2020-02-04 PROCEDURE — 6370000000 HC RX 637 (ALT 250 FOR IP): Performed by: NURSE PRACTITIONER

## 2020-02-04 PROCEDURE — 70490 CT SOFT TISSUE NECK W/O DYE: CPT

## 2020-02-04 RX ORDER — HYDROCODONE BITARTRATE AND ACETAMINOPHEN 5; 325 MG/1; MG/1
1 TABLET ORAL ONCE
Status: COMPLETED | OUTPATIENT
Start: 2020-02-04 | End: 2020-02-04

## 2020-02-04 RX ORDER — NAPROXEN 500 MG/1
500 TABLET ORAL 2 TIMES DAILY
Qty: 60 TABLET | Refills: 0 | Status: SHIPPED | OUTPATIENT
Start: 2020-02-04

## 2020-02-04 RX ORDER — KETOROLAC TROMETHAMINE 30 MG/ML
30 INJECTION, SOLUTION INTRAMUSCULAR; INTRAVENOUS ONCE
Status: COMPLETED | OUTPATIENT
Start: 2020-02-04 | End: 2020-02-04

## 2020-02-04 RX ORDER — 0.9 % SODIUM CHLORIDE 0.9 %
30 INTRAVENOUS SOLUTION INTRAVENOUS ONCE
Status: COMPLETED | OUTPATIENT
Start: 2020-02-04 | End: 2020-02-04

## 2020-02-04 RX ORDER — ACETAMINOPHEN 500 MG
1000 TABLET ORAL 4 TIMES DAILY PRN
Qty: 60 TABLET | Refills: 0 | Status: SHIPPED | OUTPATIENT
Start: 2020-02-04 | End: 2021-11-10

## 2020-02-04 RX ORDER — CLINDAMYCIN HYDROCHLORIDE 150 MG/1
450 CAPSULE ORAL 3 TIMES DAILY
Qty: 90 CAPSULE | Refills: 0 | Status: SHIPPED | OUTPATIENT
Start: 2020-02-04 | End: 2020-02-14

## 2020-02-04 RX ORDER — CLINDAMYCIN PHOSPHATE 600 MG/50ML
600 INJECTION INTRAVENOUS ONCE
Status: COMPLETED | OUTPATIENT
Start: 2020-02-04 | End: 2020-02-04

## 2020-02-04 RX ADMIN — HYDROCODONE BITARTRATE AND ACETAMINOPHEN 1 TABLET: 5; 325 TABLET ORAL at 19:17

## 2020-02-04 RX ADMIN — SODIUM CHLORIDE 1434 ML: 9 INJECTION, SOLUTION INTRAVENOUS at 18:12

## 2020-02-04 RX ADMIN — KETOROLAC TROMETHAMINE 30 MG: 30 INJECTION, SOLUTION INTRAMUSCULAR at 19:09

## 2020-02-04 RX ADMIN — CLINDAMYCIN PHOSPHATE 600 MG: 600 INJECTION, SOLUTION INTRAVENOUS at 18:12

## 2020-02-04 ASSESSMENT — PAIN SCALES - GENERAL
PAINLEVEL_OUTOF10: 4
PAINLEVEL_OUTOF10: 8
PAINLEVEL_OUTOF10: 4

## 2020-02-04 ASSESSMENT — PAIN DESCRIPTION - DESCRIPTORS: DESCRIPTORS: ACHING

## 2020-02-04 ASSESSMENT — PAIN DESCRIPTION - ONSET: ONSET: GRADUAL

## 2020-02-04 ASSESSMENT — PAIN DESCRIPTION - PROGRESSION: CLINICAL_PROGRESSION: GRADUALLY WORSENING

## 2020-02-04 ASSESSMENT — PAIN DESCRIPTION - LOCATION: LOCATION: FACE

## 2020-02-04 ASSESSMENT — PAIN DESCRIPTION - PAIN TYPE: TYPE: ACUTE PAIN

## 2020-02-04 ASSESSMENT — PAIN DESCRIPTION - FREQUENCY: FREQUENCY: CONTINUOUS

## 2020-02-04 NOTE — ED NOTES
Pt states that she started with small open areas on her face, she used a face mask and she states that the swelling and irritation became worse, there is a area on the tip of her nose as well.  She reports that areas on the left side of her face have cleared up however the right has become more swollen and red,      Edgard Jacome, RN  02/04/20 [id number]

## 2020-02-04 NOTE — ED PROVIDER NOTES
[address] [facility] [address]  Dept: [phone]  Loc: [phone]    EMERGENCY DEPARTMENT ENCOUNTER        This patient was seen and evaluated per myself in conjunction with ED attending Dr. Elroy Jackson. CHIEF COMPLAINT    Chief Complaint   Patient presents with    Abscess     to R jawline on face. pain 8/10 x1wk. HPI    Montrell Haynes is a 42 y.o. female who presents with redness of the skin located right mandibular region. Onset was 3 weeks ago. The duration has been constant since the onset. The patient has associated pain. The pain severity is 8/10. There are no alleviating factors. The context is 1 month ago she tried a new facial peel that was recommended by her friend. She states that ever since then she has been having intermittent erythema and severe acne to her face. She states however the right sided mandibular pain and swelling with erythema started approximately 2 weeks ago and has not subsided as previous areas have recently. She denies being diabetic or immunocompromised. Denies any shortness of breath or trouble swallowing. She states that she came to the ED for further evaluation and treatment. REVIEW OF SYSTEMS    Constitutional: no fever, chills or sweats  Respiratory:  No cough or shortness of breath   Cardiovascular:  No chest pain  GI: No vomiting or abdominal pain  Musculoskeletal: Full range of motion of the mandible. See above  Skin: see HPI  All other systems reviewed and are negative.     PAST MEDICAL HISTORY/SURGICAL HISTORY     Past Medical History:   Diagnosis Date    Anxiety     Asthma     Bipolar affective ([address]     Depression     Fracture of nasal bone     GERD (gastroesophageal reflux disease)     Hypothyroidism     Influenza A 2019    Recurrent upper respiratory infection (URI)     Tinnitus      Past Surgical History:   Procedure Laterality Date     Penicillins Other (See Comments) and Rash    Iv Dye [Iodides] Rash       FAMILY HISTORY/SOCIAL HISTORY    Family History   Problem Relation Age of Onset    Mental Illness Mother      Social History     Socioeconomic History    Marital status: Legally      Spouse name: None    Number of children: None    Years of education: None    Highest education level: None   Occupational History    None   Social Needs    Financial resource strain: None    Food insecurity:     Worry: None     Inability: None    Transportation needs:     Medical: None     Non-medical: None   Tobacco Use    Smoking status: Current Every Day Smoker     Packs/day: 1.00    Smokeless tobacco: Current User    Tobacco comment: smoking a e cig now    Substance and Sexual Activity    Alcohol use: Yes     Comment: occ beer    Drug use: None    Sexual activity: None   Lifestyle    Physical activity:     Days per week: None     Minutes per session: None    Stress: None   Relationships    Social connections:     Talks on phone: None     Gets together: None     Attends Temple service: None     Active member of club or organization: None     Attends meetings of clubs or organizations: None     Relationship status: None    Intimate partner violence:     Fear of current or ex partner: None     Emotionally abused: None     Physically abused: None     Forced sexual activity: None   Other Topics Concern    None   Social History Narrative    None       PHYSICAL EXAM    VITAL SIGNS: BP (!) 141/88   Pulse 106   Temp 98.3 °F (36.8 °C) (Oral)   Resp 21   Ht 5' 1\" (1.549 m)   Wt 231 lb 14.8 oz (105.2 kg)   SpO2 96%   BMI 43.82 kg/m²    General:  Well developed, Well nourished, no acute distress  HENT: No trismus, moist mucus membranes  Respiratory: Lungs clear to auscultation bilaterally, no retractions  Cardiovascular:  regular rate, no JVD  GI:  Soft, no tenderness   Musculoskeletal: + edema right mandible, no acute bony spreading of the redness beyond the demarcated area. The patient verbalizes understanding. FINAL IMPRESSION  1.  Facial cellulitis        PLAN  Discharge with close outpatient follow-up with primary care provider    (Please note that this note was completed with a voice recognition program.  Every attempt was made to edit the dictations, but inevitably there remain words that are mis-transcribed.)          JADE Sifuentes - SARA  02/04/20 1959

## 2020-02-05 NOTE — ED NOTES
Pt requesting pain meds, she states her father is coming, I explained he has to be present because minors are in the room      Rema Waggoner RN  02/04/20 2006

## 2020-02-05 NOTE — ED NOTES
Pt d./c home with AVS no s.s of distress noted scripts in hand pts father is driving her home, pt father present at d.c      Daniel Awan RN  02/04/20 2005

## 2020-02-05 NOTE — ED PROVIDER NOTES
I independently performed a history and physical on Nanette Reese. All diagnostic, treatment, and disposition decisions were made by myself in conjunction with the advanced practice provider.     -Nanette Reese is a 42 y.o. female presents to ED for facial infection. Patient states she breaks out often and put on an ointment that was recommended by a friend. States she does not know if it got into an open wound, but then area started to worsen, erythema started to enlarge and became more and more painful. Denies fever, n/v, trismus, throat pain, odynophagia, dysphagia  -Arrival afebrile mildly tachycardic at 106  -PE: well appearing, nontoxic, not in acute distress. RRR, CTAB/l, no w/r/r, abdomen soft, ND, NTTP, + BS x 4, no rigidity, rebound, guarding, area of erythema to right chin, tip of nose and left lateral corner of mouth, no fluctuance. No throat swelling, erythema, swelling underneath the tongue or elevated tongue or oral lesions.  -lab workup is within normal limits, no leukocytosis or bandemia. -CT facial and soft tissue neck: Soft tissue swelling seen overlying the right side of the mandible without evidence for osteomyelitis. No definite loculated fluid collection. Subcutaneous soft tissue swelling over the right mandible without underlying bony destruction favoring cellulitis. No organizing abscess. Right submandibular gland is slightly larger than the left which may be related to sialoadenitis shotty submandibular and anterior cervical chain lymph nodes. The patient's molars are absent in the mandible bilaterally. Sick molars are absent in the maxilla bilaterally. Although not bone cyst or abscess noted absence of teeth use history of periodontal disease. -Given IV clindamycin here in ED  -Discussed results with patient, plan for discharge home with rx clindamycin for cellulitis and close follow up with PCP was discussed with patient and family.  Strict ED return precautions given for new/worsending symptoms. Patient and family in agreement with plan, verbally confirm understanding and have no further questions/concerns. For further details of [address] [place] [address],Third Floor emergency department encounter, please see MELISSA Rossi's documentation.         Caitlin Abernathy MD  02/04/20 9240

## 2020-02-08 LAB
BLOOD CULTURE, ROUTINE: NORMAL
CULTURE, BLOOD 2: NORMAL

## 2020-03-20 ENCOUNTER — TELEPHONE (OUTPATIENT)
Dept: PSYCHIATRY | Age: 40
End: 2020-03-20

## 2020-03-20 NOTE — TELEPHONE ENCOUNTER
Called pt and scheduled for Monday. Pt has not been seen since last nov.  Needs to be seen to get medication

## 2020-03-20 NOTE — TELEPHONE ENCOUNTER
PT called in requesting refills on several medications. Aripiprazole 15 mg  Desvenlafaxine succine 50 mg    Pt only listed those two. Pt says that there is a 3rd medication but she is not sure what it is.      Best call back number: [phone]

## 2020-03-23 ENCOUNTER — OFFICE VISIT (OUTPATIENT)
Dept: PSYCHIATRY | Age: 40
End: 2020-03-23

## 2020-03-23 PROCEDURE — 99999 PR OFFICE/OUTPT VISIT,PROCEDURE ONLY: CPT | Performed by: PSYCHIATRY & NEUROLOGY

## 2020-03-23 RX ORDER — ARIPIPRAZOLE 15 MG/1
15 TABLET ORAL DAILY
Qty: 90 TABLET | Refills: 1 | Status: SHIPPED | OUTPATIENT
Start: 2020-03-23 | End: 2020-09-21 | Stop reason: SDUPTHER

## 2020-03-23 RX ORDER — DESVENLAFAXINE 50 MG/1
50 TABLET, EXTENDED RELEASE ORAL DAILY
Qty: 90 TABLET | Refills: 1 | Status: SHIPPED | OUTPATIENT
Start: 2020-03-23 | End: 2020-09-21 | Stop reason: SDUPTHER

## 2020-03-23 RX ORDER — TRAZODONE HYDROCHLORIDE 50 MG/1
TABLET ORAL
Qty: 90 TABLET | Refills: 1 | Status: SHIPPED | OUTPATIENT
Start: 2020-03-23 | End: 2020-09-21 | Stop reason: SDUPTHER

## 2020-03-23 NOTE — PROGRESS NOTES
Unfortunately, pt relapsed onto meth and got an abscess in her face. A lady upstairs is the wrong people, and she kept being around her, and ended up using. Started going back to groups, and now has 23 days clean. Pt feeling pretty \"antsy,\" hates being locked in the house. Back on methadone 100. I'll rf abilify, trazodone, pristiq. Not sucidal, not homicidal, just feels like she needs her meds rf'd. We'll do labs when the pandemic subsides.

## 2020-05-11 RX ORDER — OMEPRAZOLE 40 MG/1
CAPSULE, DELAYED RELEASE ORAL
Qty: 30 CAPSULE | Refills: 4 | Status: SHIPPED | OUTPATIENT
Start: 2020-05-11 | End: 2020-05-13 | Stop reason: SDUPTHER

## 2020-05-13 NOTE — TELEPHONE ENCOUNTER
[address]  Past appt: 5/9  Scheduled for VV; 5/19  Pt does not have a smart phone though, so scheduled a telephone call, if not ok, I will reschedule for July/sept

## 2020-05-14 RX ORDER — OMEPRAZOLE 40 MG/1
CAPSULE, DELAYED RELEASE ORAL
Qty: 30 CAPSULE | Refills: 4 | Status: SHIPPED | OUTPATIENT
Start: 2020-05-14

## 2020-05-26 ENCOUNTER — TELEPHONE (OUTPATIENT)
Dept: FAMILY MEDICINE CLINIC | Age: 40
End: 2020-05-26

## 2020-06-16 ENCOUNTER — OFFICE VISIT (OUTPATIENT)
Dept: FAMILY MEDICINE CLINIC | Age: 40
End: 2020-06-16
Payer: MEDICAID

## 2020-06-16 VITALS
RESPIRATION RATE: 16 BRPM | HEIGHT: 60 IN | TEMPERATURE: 97.1 F | BODY MASS INDEX: 51.4 KG/M2 | DIASTOLIC BLOOD PRESSURE: 64 MMHG | SYSTOLIC BLOOD PRESSURE: 118 MMHG | WEIGHT: 261.8 LBS | OXYGEN SATURATION: 93 % | HEART RATE: 97 BPM

## 2020-06-16 LAB
T4 FREE: 1.1 NG/DL (ref 0.9–1.8)
TSH REFLEX: 7.42 UIU/ML (ref 0.27–4.2)

## 2020-06-16 PROCEDURE — 4004F PT TOBACCO SCREEN RCVD TLK: CPT | Performed by: INTERNAL MEDICINE

## 2020-06-16 PROCEDURE — 99214 OFFICE O/P EST MOD 30 MIN: CPT | Performed by: INTERNAL MEDICINE

## 2020-06-16 PROCEDURE — 36415 COLL VENOUS BLD VENIPUNCTURE: CPT | Performed by: INTERNAL MEDICINE

## 2020-06-16 PROCEDURE — 96160 PT-FOCUSED HLTH RISK ASSMT: CPT | Performed by: INTERNAL MEDICINE

## 2020-06-16 PROCEDURE — G8417 CALC BMI ABV UP PARAM F/U: HCPCS | Performed by: INTERNAL MEDICINE

## 2020-06-16 PROCEDURE — G8427 DOCREV CUR MEDS BY ELIG CLIN: HCPCS | Performed by: INTERNAL MEDICINE

## 2020-06-16 ASSESSMENT — PATIENT HEALTH QUESTIONNAIRE - PHQ9
3. TROUBLE FALLING OR STAYING ASLEEP: 2
6. FEELING BAD ABOUT YOURSELF - OR THAT YOU ARE A FAILURE OR HAVE LET YOURSELF OR YOUR FAMILY DOWN: 2
2. FEELING DOWN, DEPRESSED OR HOPELESS: 2
5. POOR APPETITE OR OVEREATING: 2
7. TROUBLE CONCENTRATING ON THINGS, SUCH AS READING THE NEWSPAPER OR WATCHING TELEVISION: 0
SUM OF ALL RESPONSES TO PHQ QUESTIONS 1-9: 15
1. LITTLE INTEREST OR PLEASURE IN DOING THINGS: 2
SUM OF ALL RESPONSES TO PHQ9 QUESTIONS 1 & 2: 4
SUM OF ALL RESPONSES TO PHQ QUESTIONS 1-9: 15
4. FEELING TIRED OR HAVING LITTLE ENERGY: 2
10. IF YOU CHECKED OFF ANY PROBLEMS, HOW DIFFICULT HAVE THESE PROBLEMS MADE IT FOR YOU TO DO YOUR WORK, TAKE CARE OF THINGS AT HOME, OR GET ALONG WITH OTHER PEOPLE: 3
9. THOUGHTS THAT YOU WOULD BE BETTER OFF DEAD, OR OF HURTING YOURSELF: 0
8. MOVING OR SPEAKING SO SLOWLY THAT OTHER PEOPLE COULD HAVE NOTICED. OR THE OPPOSITE, BEING SO FIGETY OR RESTLESS THAT YOU HAVE BEEN MOVING AROUND A LOT MORE THAN USUAL: 3

## 2020-06-16 ASSESSMENT — ENCOUNTER SYMPTOMS
COUGH: 0
VOMITING: 0
SINUS PRESSURE: 0
RHINORRHEA: 0
SORE THROAT: 0
SHORTNESS OF BREATH: 0
NAUSEA: 0
DIARRHEA: 0
WHEEZING: 0
EYE REDNESS: 0
ABDOMINAL PAIN: 0
EYE DISCHARGE: 0
SINUS PAIN: 0

## 2020-06-16 NOTE — PROGRESS NOTES
Standing   Cuff Size: Large Adult   Pulse: 97   Resp: 16   Temp: 97.1 °F (36.2 °C)   SpO2: 93%   Weight: 261 lb 12.8 oz (118.8 kg)   Height: 5' (1.524 m)     Estimated body mass index is 51.13 kg/m² as calculated from the following:    Height as of this encounter: 5' (1.524 m). Weight as of this encounter: 261 lb 12.8 oz (118.8 kg). Physical Exam  Constitutional:       General: She is not in acute distress. Appearance: She is well-developed. HENT:      Head: Normocephalic and atraumatic. Right Ear: Ear canal normal.      Mouth/Throat:      Mouth: Mucous membranes are moist.      Pharynx: Oropharynx is clear. Cardiovascular:      Rate and Rhythm: Normal rate and regular rhythm. Heart sounds: Normal heart sounds. No murmur. Pulmonary:      Effort: Pulmonary effort is normal. No respiratory distress. Breath sounds: Normal breath sounds. No wheezing or rales. Musculoskeletal:         General: No tenderness. Skin:     General: Skin is warm and dry. Capillary Refill: Capillary refill takes less than 2 seconds. Findings: No rash. Neurological:      Cranial Nerves: No cranial nerve deficit. Psychiatric:      Comments: Depressed affect. Tearful          ASSESSMENT/PLAN:  1. Hypothyroidism, unspecified type  Fu labs  - TSH with Reflex    2. Gastroesophageal reflux disease without esophagitis  Continue PPI    3. Anxiety and depression  Poorly controlled. Fu with psychiatry as planned. No SI.     4. Drug addiction ([facility] [address]  I do not have an explanation for her regarding the alcohol in her  UDS. She asked me to prescribe methadone but I cannot. She will try to work with her methadone clinic. Drug abstinence highly encouraged. Return in about 6 months (around 12/16/2020). An electronic signature was used to authenticate this note.     --Brooke Rosales MD on 6/16/2020 at 1:26 PM

## 2020-06-18 RX ORDER — LEVOTHYROXINE SODIUM 0.05 MG/1
50 TABLET ORAL DAILY
Qty: 30 TABLET | Refills: 2 | Status: SHIPPED | OUTPATIENT
Start: 2020-06-18

## 2020-09-21 ENCOUNTER — OFFICE VISIT (OUTPATIENT)
Dept: PSYCHIATRY | Age: 40
End: 2020-09-21
Payer: MEDICAID

## 2020-09-21 PROCEDURE — 99442 PR PHYS/QHP TELEPHONE EVALUATION 11-20 MIN: CPT | Performed by: PSYCHIATRY & NEUROLOGY

## 2020-09-21 RX ORDER — LEVOMEFOLATE CALCIUM 15 MG
15 TABLET ORAL DAILY
Qty: 90 TABLET | Refills: 1 | Status: SHIPPED | OUTPATIENT
Start: 2020-09-21

## 2020-09-21 RX ORDER — ARIPIPRAZOLE 15 MG/1
15 TABLET ORAL DAILY
Qty: 90 TABLET | Refills: 1 | Status: SHIPPED | OUTPATIENT
Start: 2020-09-21 | End: 2021-02-17 | Stop reason: SDUPTHER

## 2020-09-21 RX ORDER — DESVENLAFAXINE 50 MG/1
50 TABLET, EXTENDED RELEASE ORAL DAILY
Qty: 90 TABLET | Refills: 1 | Status: SHIPPED | OUTPATIENT
Start: 2020-09-21 | End: 2021-02-17 | Stop reason: SDUPTHER

## 2020-09-21 RX ORDER — TRAZODONE HYDROCHLORIDE 100 MG/1
100 TABLET ORAL NIGHTLY
Qty: 90 TABLET | Refills: 1 | Status: SHIPPED | OUTPATIENT
Start: 2020-09-21 | End: 2021-02-17

## 2020-09-21 NOTE — PROGRESS NOTES
Psych Follow Up Progress Note    9/21/20  Ameya Gregg  <[id number]>    I have reviewed recent documentation:  Ameya Gregg is a 47 y.o. female  This patient  has a past medical history of Anxiety, Asthma, Bipolar affective ([address] Depression, Fracture of nasal bone, GERD (gastroesophageal reflux disease), Hypothyroidism, Influenza A, Recurrent upper respiratory infection (URI), and Tinnitus. I called this patient today because of safety concerns regarding coronavirus. Subjective/Interval Hx:  Doing ok. Seems to like the Ziptr, which is good. Clean again, and had 2 clean urines at methadone clinic. Appetite not great. Sleep is broken at times. But on the whole, pt is pretty pleased with how things are going! Location:  [facility]  Severity:  Mild at this point  Context:  As above. Modifiers:  Being clean really helps. Quality:  Anxiety, depression. Objective:      No results found for this or any previous visit (from the past 168 hour(s)).     Current Outpatient Medications   Medication Sig Dispense Refill    levothyroxine (SYNTHROID) 50 MCG tablet Take 1 tablet by mouth Daily 30 tablet 2    omeprazole (PRILOSEC) 40 MG delayed release capsule TAKE ONE CAPSULE BY MOUTH EVERY MORNING BEFORE BREAKFAST 30 capsule 4    traZODone (DESYREL) 50 MG tablet 1 to 3 tabs nightly as needed for sleep 90 tablet 1    desvenlafaxine succinate (PRISTIQ) 50 MG TB24 extended release tablet Take 1 tablet by mouth daily 90 tablet 1    ARIPiprazole (ABILIFY) 15 MG tablet Take 1 tablet by mouth daily 90 tablet 1    naproxen (NAPROSYN) 500 MG tablet Take 1 tablet by mouth 2 times daily 60 tablet 0    acetaminophen (TYLENOL) 500 MG tablet Take 2 tablets by mouth 4 times daily as needed for Pain 60 tablet 0    L-Methylfolate 15 MG TABS Take 15 mg by mouth daily 30 tablet 2    ranitidine (ZANTAC) 300 MG tablet Take 1 tablet by mouth nightly (Patient not taking: Reported on 6/16/2020) 30 tablet 3

## 2020-09-22 ENCOUNTER — TELEPHONE (OUTPATIENT)
Dept: PRIMARY CARE CLINIC | Age: 40
End: 2020-09-22

## 2020-09-22 NOTE — TELEPHONE ENCOUNTER
----- Message from [facility] sent at 9/22/2020 12:55 PM EDT -----  Subject: Message to Provider    QUESTIONS  Information for Provider? patient may call back to get scheduled with Dr. Kandy Hull   transfer her to molly   her pcp [facility]   no longer schedules for Kandy Hull. ---------------------------------------------------------------------------  --------------  Ricarda KRAFT  What is the best way for the office to contact you? Do not leave any   message   patient will call back for answer  Preferred Call Back Phone Number? [phone]  ---------------------------------------------------------------------------  --------------  SCRIPT ANSWERS  Relationship to Patient?  Self

## 2020-09-23 ENCOUNTER — TELEPHONE (OUTPATIENT)
Dept: FAMILY MEDICINE CLINIC | Age: 40
End: 2020-09-23

## 2021-02-17 ENCOUNTER — OFFICE VISIT (OUTPATIENT)
Dept: PSYCHIATRY | Age: 41
End: 2021-02-17
Payer: MEDICAID

## 2021-02-17 VITALS
WEIGHT: 293 LBS | SYSTOLIC BLOOD PRESSURE: 138 MMHG | BODY MASS INDEX: 57.52 KG/M2 | DIASTOLIC BLOOD PRESSURE: 80 MMHG | OXYGEN SATURATION: 100 % | HEART RATE: 92 BPM | HEIGHT: 60 IN

## 2021-02-17 DIAGNOSIS — E78.5 HYPERLIPIDEMIA, UNSPECIFIED HYPERLIPIDEMIA TYPE: Primary | ICD-10-CM

## 2021-02-17 DIAGNOSIS — R73.9 HYPERGLYCEMIA: ICD-10-CM

## 2021-02-17 PROCEDURE — 99214 OFFICE O/P EST MOD 30 MIN: CPT | Performed by: PSYCHIATRY & NEUROLOGY

## 2021-02-17 PROCEDURE — 4004F PT TOBACCO SCREEN RCVD TLK: CPT | Performed by: PSYCHIATRY & NEUROLOGY

## 2021-02-17 PROCEDURE — G8428 CUR MEDS NOT DOCUMENT: HCPCS | Performed by: PSYCHIATRY & NEUROLOGY

## 2021-02-17 PROCEDURE — G8417 CALC BMI ABV UP PARAM F/U: HCPCS | Performed by: PSYCHIATRY & NEUROLOGY

## 2021-02-17 PROCEDURE — G8484 FLU IMMUNIZE NO ADMIN: HCPCS | Performed by: PSYCHIATRY & NEUROLOGY

## 2021-02-17 RX ORDER — ARIPIPRAZOLE 15 MG/1
15 TABLET ORAL DAILY
Qty: 90 TABLET | Refills: 1 | Status: SHIPPED | OUTPATIENT
Start: 2021-02-17 | End: 2021-12-16

## 2021-02-17 RX ORDER — AMITRIPTYLINE HYDROCHLORIDE 50 MG/1
50 TABLET, FILM COATED ORAL NIGHTLY
Qty: 30 TABLET | Refills: 5 | Status: SHIPPED | OUTPATIENT
Start: 2021-02-17 | End: 2022-03-14 | Stop reason: SDUPTHER

## 2021-02-17 RX ORDER — DESVENLAFAXINE 50 MG/1
50 TABLET, EXTENDED RELEASE ORAL DAILY
Qty: 90 TABLET | Refills: 1 | Status: SHIPPED | OUTPATIENT
Start: 2021-02-17 | End: 2021-12-16 | Stop reason: SDUPTHER

## 2021-02-17 NOTE — PROGRESS NOTES
Psych Follow Up Progress Note    2/17/21  Khurram Mckeon  <[id number]>    I have reviewed recent documentation:  Khurram Mckeon is a 47 y.o. female  This patient  has a past medical history of Anxiety, Asthma, Bipolar affective ([address] Depression, Fracture of nasal bone, GERD (gastroesophageal reflux disease), Hypothyroidism, Influenza A, Recurrent upper respiratory infection (URI), and Tinnitus. Chief Complaint   Patient presents with    Anxiety       Subjective/Interval Hx:  Found out she had DJD in back. Lots of pain. Anxiety and depression isn't too bad, though physical pain isn't great. Wonders about disability? Needs some paperwork for dx. Got a CM via [facility]. They are very helpful. Doesn't feel like trazodone is so good, tends to really knock her out. Nightmares not bad! Wakes up crying sometimes, but better than it was. Not screaming as much. Stays in the house most of the time for anxiety. Furthest she goes is to the grocery, and has to have somebody with her. Gets flashbacks when she goes out, \"I see somebody that looks like somebody, and it all rolls back. \"    Location:  Mind  Severity:  mildish at this point. Context:  As above. Modifiers:  meds are helpful! Quality:  Anxiety, depression    Objective:  Vitals:    02/17/21 1257   BP: 138/80   Pulse: 92   SpO2: 100%   Weight: 298 lb (135.2 kg)   Height: 5' (1.524 m)        Body mass index is 58.2 kg/m². No results found for this or any previous visit (from the past 168 hour(s)).     Current Outpatient Medications   Medication Sig Dispense Refill    ARIPiprazole (ABILIFY) 15 MG tablet Take 1 tablet by mouth daily 90 tablet 1    L-Methylfolate 15 MG TABS Take 15 mg by mouth daily 90 tablet 1    traZODone (DESYREL) 100 MG tablet Take 1 tablet by mouth nightly 90 tablet 1    desvenlafaxine succinate (PRISTIQ) 50 MG TB24 extended release tablet Take 1 tablet by mouth daily 90 tablet 1  levothyroxine (SYNTHROID) 50 MCG tablet Take 1 tablet by mouth Daily 30 tablet 2    omeprazole (PRILOSEC) 40 MG delayed release capsule TAKE ONE CAPSULE BY MOUTH EVERY MORNING BEFORE BREAKFAST 30 capsule 4    naproxen (NAPROSYN) 500 MG tablet Take 1 tablet by mouth 2 times daily 60 tablet 0    acetaminophen (TYLENOL) 500 MG tablet Take 2 tablets by mouth 4 times daily as needed for Pain 60 tablet 0    ranitidine (ZANTAC) 300 MG tablet Take 1 tablet by mouth nightly (Patient not taking: Reported on 6/16/2020) 30 tablet 3    predniSONE (DELTASONE) 20 MG tablet Please take 2 tablets x 5 days (Patient not taking: Reported on 6/16/2020) 10 tablet 0    Fluticasone-Salmeterol 113-14 MCG/ACT AEPB Inhale 1 puff into the lungs 2 times daily 1 each 5    budesonide-formoterol (SYMBICORT) 160-4.5 MCG/ACT AERO Inhale 2 puffs into the lungs 2 times daily 1 Inhaler 5    ondansetron (ZOFRAN ODT) 4 MG disintegrating tablet Take 1 tablet by mouth every 8 hours as needed for Nausea or Vomiting 20 tablet 0    albuterol sulfate HFA (PROAIR HFA) 108 (90 Base) MCG/ACT inhaler Inhale 2 puffs into the lungs every 4 hours as needed for Wheezing 1 Inhaler 0     No current facility-administered medications for this visit. ROS:  No tremor, gait nl    MSE:  A-casually dressed, good EC, pleasant and engageable  A-full  M-euthymic  S-grossly A + O  I/J-fair/fair  T-linear, goal-directed. Speech c nl r/t/v/a. No resp to int stim. Grossly A + O.    47 y.o.      1.  Mood d/o NOS, Anxiety/PTSD-We'll cont abilify 15 qd, we'll d/c trazodone, makes pt \"knocked out. \"  Try amitrip instead 50 qhs. But she'll keep trazodone for emergencies.    We'll cont pristiq 50 qd, for mood and energy.  R/b/a d/w pt including metabolic trouble, DM, cholesterol, dyskinesia, etc.  We'll see about PTSD clinic in the future.   We'll get labs after corona.     I have reviewed pt's genesight, she has a number of metabolic issues and MTHFR deficiency.    2.  Drug use d/o, severe-f/u at [facility] methadone [facility].  On methadone for now.       3.  MTHFR deficiency-couldn't afford deplin.     On abilify, zyprexa, tegretol, wellbutrin, celexa, klonopin, hydroxyzine, lamictal, trazodone in past.      I have spent >25 mins with this patient on working on coping skills and med mgt, and > 1/2 of that time was spent counseling this pt.

## 2021-02-26 DIAGNOSIS — E78.5 HYPERLIPIDEMIA, UNSPECIFIED HYPERLIPIDEMIA TYPE: ICD-10-CM

## 2021-02-26 DIAGNOSIS — R73.9 HYPERGLYCEMIA: ICD-10-CM

## 2021-02-26 LAB
CHOLESTEROL, TOTAL: 138 MG/DL (ref 0–199)
HDLC SERPL-MCNC: 37 MG/DL (ref 40–60)
LDL CHOLESTEROL CALCULATED: 71 MG/DL
TRIGL SERPL-MCNC: 152 MG/DL (ref 0–150)
VLDLC SERPL CALC-MCNC: 30 MG/DL

## 2021-02-27 LAB
ESTIMATED AVERAGE GLUCOSE: 131.2 MG/DL
HBA1C MFR BLD: 6.2 %

## 2021-03-24 ENCOUNTER — OFFICE VISIT (OUTPATIENT)
Dept: PSYCHIATRY | Age: 41
End: 2021-03-24
Payer: MEDICAID

## 2021-03-24 VITALS
WEIGHT: 293 LBS | OXYGEN SATURATION: 87 % | HEART RATE: 101 BPM | BODY MASS INDEX: 57.52 KG/M2 | DIASTOLIC BLOOD PRESSURE: 80 MMHG | HEIGHT: 60 IN | SYSTOLIC BLOOD PRESSURE: 138 MMHG

## 2021-03-24 DIAGNOSIS — F39 MOOD DISORDER (HCC): ICD-10-CM

## 2021-03-24 DIAGNOSIS — F43.10 PTSD (POST-TRAUMATIC STRESS DISORDER): Primary | ICD-10-CM

## 2021-03-24 PROCEDURE — G8484 FLU IMMUNIZE NO ADMIN: HCPCS | Performed by: PSYCHIATRY & NEUROLOGY

## 2021-03-24 PROCEDURE — G8427 DOCREV CUR MEDS BY ELIG CLIN: HCPCS | Performed by: PSYCHIATRY & NEUROLOGY

## 2021-03-24 PROCEDURE — G8417 CALC BMI ABV UP PARAM F/U: HCPCS | Performed by: PSYCHIATRY & NEUROLOGY

## 2021-03-24 PROCEDURE — 99214 OFFICE O/P EST MOD 30 MIN: CPT | Performed by: PSYCHIATRY & NEUROLOGY

## 2021-03-24 PROCEDURE — 4004F PT TOBACCO SCREEN RCVD TLK: CPT | Performed by: PSYCHIATRY & NEUROLOGY

## 2021-03-24 RX ORDER — ZIPRASIDONE HYDROCHLORIDE 40 MG/1
CAPSULE ORAL
Qty: 60 CAPSULE | Refills: 3 | Status: SHIPPED | OUTPATIENT
Start: 2021-03-24 | End: 2021-12-16 | Stop reason: SDUPTHER

## 2021-11-10 ENCOUNTER — HOSPITAL ENCOUNTER (EMERGENCY)
Age: 41
Discharge: HOME | End: 2021-11-10
Payer: MEDICAID

## 2021-11-10 ENCOUNTER — APPOINTMENT (OUTPATIENT)
Dept: GENERAL RADIOLOGY | Age: 41
End: 2021-11-10
Payer: MEDICAID

## 2021-11-10 VITALS
SYSTOLIC BLOOD PRESSURE: 149 MMHG | HEART RATE: 81 BPM | TEMPERATURE: 98.5 F | WEIGHT: 293 LBS | HEIGHT: 60 IN | DIASTOLIC BLOOD PRESSURE: 92 MMHG | OXYGEN SATURATION: 95 % | RESPIRATION RATE: 22 BRPM | BODY MASS INDEX: 57.52 KG/M2

## 2021-11-10 DIAGNOSIS — M25.562 ACUTE PAIN OF LEFT KNEE: Primary | ICD-10-CM

## 2021-11-10 DIAGNOSIS — M17.10 ARTHRITIS OF KNEE: ICD-10-CM

## 2021-11-10 PROCEDURE — 6370000000 HC RX 637 (ALT 250 FOR IP): Performed by: PHYSICIAN ASSISTANT

## 2021-11-10 PROCEDURE — 73560 X-RAY EXAM OF KNEE 1 OR 2: CPT

## 2021-11-10 PROCEDURE — 99284 EMERGENCY DEPT VISIT MOD MDM: CPT

## 2021-11-10 RX ORDER — ACETAMINOPHEN 500 MG
1000 TABLET ORAL EVERY 6 HOURS PRN
Qty: 40 TABLET | Refills: 0 | Status: SHIPPED | OUTPATIENT
Start: 2021-11-10

## 2021-11-10 RX ORDER — PREDNISONE 10 MG/1
40 TABLET ORAL DAILY
Qty: 20 TABLET | Refills: 0 | Status: SHIPPED | OUTPATIENT
Start: 2021-11-10 | End: 2021-11-15

## 2021-11-10 RX ORDER — HYDROCODONE BITARTRATE AND ACETAMINOPHEN 5; 325 MG/1; MG/1
1 TABLET ORAL ONCE
Status: COMPLETED | OUTPATIENT
Start: 2021-11-10 | End: 2021-11-10

## 2021-11-10 RX ORDER — PREDNISONE 20 MG/1
40 TABLET ORAL ONCE
Status: COMPLETED | OUTPATIENT
Start: 2021-11-10 | End: 2021-11-10

## 2021-11-10 RX ADMIN — HYDROCODONE BITARTRATE AND ACETAMINOPHEN 1 TABLET: 5; 325 TABLET ORAL at 13:00

## 2021-11-10 RX ADMIN — PREDNISONE 40 MG: 20 TABLET ORAL at 12:55

## 2021-11-10 ASSESSMENT — PAIN DESCRIPTION - PROGRESSION: CLINICAL_PROGRESSION: NOT CHANGED

## 2021-11-10 ASSESSMENT — ENCOUNTER SYMPTOMS
BACK PAIN: 0
NAUSEA: 0

## 2021-11-10 ASSESSMENT — PAIN DESCRIPTION - LOCATION: LOCATION: LEG;KNEE

## 2021-11-10 ASSESSMENT — PAIN DESCRIPTION - PAIN TYPE: TYPE: ACUTE PAIN

## 2021-11-10 ASSESSMENT — PAIN SCALES - GENERAL
PAINLEVEL_OUTOF10: 7
PAINLEVEL_OUTOF10: 7

## 2021-11-10 ASSESSMENT — PAIN DESCRIPTION - ORIENTATION: ORIENTATION: LEFT

## 2021-11-10 ASSESSMENT — PAIN - FUNCTIONAL ASSESSMENT: PAIN_FUNCTIONAL_ASSESSMENT: PREVENTS OR INTERFERES SOME ACTIVE ACTIVITIES AND ADLS

## 2021-11-10 ASSESSMENT — PAIN DESCRIPTION - ONSET: ONSET: ON-GOING

## 2021-11-10 ASSESSMENT — PAIN DESCRIPTION - FREQUENCY: FREQUENCY: CONTINUOUS

## 2021-11-10 ASSESSMENT — PAIN DESCRIPTION - DESCRIPTORS: DESCRIPTORS: ACHING;SHARP;SHOOTING

## 2021-11-10 NOTE — ED PROVIDER NOTES
[id number]      Pt Name: Emily Farrar  MRN: [id number]  [place] 1980  Date of evaluation: 11/10/2021  Provider: Cindi Gilford, PA-C    This patient was not seen and evaluated by the attending physician No att. providers found. CHIEF COMPLAINT      Chief Complaint: Knee pain      HISTORYOF PRESENT ILLNESS  (Location/Symptom, Timing/Onset, Context/Setting, Quality, Duration, Modifying Factors, Severity.)   Emily Farrar is a 46 y.o. female who presents to the emergency department complaining of left knee pain which started 2 days ago. The patient states that she gradual onset of pain around the knee that radiates down her shin and into her foot after doing laundry at the [place]. She denies any injury. She says pain is rated 7 out of 10. It worsens with weightbearing or movement. She has taken ibuprofen with no relief. Reports associated swelling. Denies numbness or weakness. Nursing Notes were reviewed and I agree. REVIEW OF SYSTEMS    (2-9 systems for level 4, 10 or more forlevel 5)     Review of Systems   Constitutional: Negative for chills and fever. Gastrointestinal: Negative for nausea. Genitourinary: Negative for difficulty urinating. Musculoskeletal: Positive for arthralgias. Negative for back pain and neck pain. Skin: Negative for rash and wound. Neurological: Negative for weakness and numbness. All other systems reviewed and are negative. Positives and Pertinent negatives as per HPI. Except as noted above the remainder of the review of systems was reviewed and negative.        PAST MEDICALHISTORY         Diagnosis Date    Anxiety     Asthma     Bipolar affective ([facility] [address]     Depression     Fracture of nasal bone     GERD (gastroesophageal reflux disease)     Hypothyroidism     Influenza A 02/24/2019    Recurrent upper respiratory infection (URI)     Tinnitus        SURGICAL HISTORY Procedure Laterality Date     SECTION  2007     SECTION  2009     SECTION  2010     SECTION  2013    HERNIA REPAIR      Multiple Hernia repairs    TYMPANOSTOMY TUBE PLACEMENT      As a child       CURRENT MEDICATIONS       Previous Medications    ALBUTEROL SULFATE HFA (PROAIR HFA) 108 (90 BASE) MCG/ACT INHALER    Inhale 2 puffs into the lungs every 4 hours as needed for Wheezing    AMITRIPTYLINE (ELAVIL) 50 MG TABLET    Take 1 tablet by mouth nightly    ARIPIPRAZOLE (ABILIFY) 15 MG TABLET    Take 1 tablet by mouth daily    BUDESONIDE-FORMOTEROL (SYMBICORT) 160-4.5 MCG/ACT AERO    Inhale 2 puffs into the lungs 2 times daily    DESVENLAFAXINE SUCCINATE (PRISTIQ) 50 MG TB24 EXTENDED RELEASE TABLET    Take 1 tablet by mouth daily    FLUTICASONE-SALMETEROL 113-14 MCG/ACT AEPB    Inhale 1 puff into the lungs 2 times daily    L-METHYLFOLATE 15 MG TABS    Take 15 mg by mouth daily    LEVOTHYROXINE (SYNTHROID) 50 MCG TABLET    Take 1 tablet by mouth Daily    NAPROXEN (NAPROSYN) 500 MG TABLET    Take 1 tablet by mouth 2 times daily    OMEPRAZOLE (PRILOSEC) 40 MG DELAYED RELEASE CAPSULE    TAKE ONE CAPSULE BY MOUTH EVERY MORNING BEFORE BREAKFAST    ONDANSETRON (ZOFRAN ODT) 4 MG DISINTEGRATING TABLET    Take 1 tablet by mouth every 8 hours as needed for Nausea or Vomiting    RANITIDINE (ZANTAC) 300 MG TABLET    Take 1 tablet by mouth nightly    ZIPRASIDONE (GEODON) 40 MG CAPSULE    1 tab nightly WITH FOOD for 2 weeks, then 2 tabs nightly WITH FOOD. ALLERGIES     Penicillins and Iv dye [iodides]    FAMILY HISTORY           Problem Relation Age of Onset    Mental Illness Mother      Family Status   Relation Name Status    Mother  Alive    Father  Alive        SOCIAL HISTORY    reports that she has been smoking. She has been smoking about 1.00 pack per day. She uses smokeless tobacco. She reports current alcohol use.  She reports previous drug use.    PHYSICAL EXAM    (up to 7 for level 4, 8 or more for level 5)     ED Triage Vitals [11/10/21 1218]   BP Temp Temp Source Pulse Resp SpO2 Height Weight   (!) 149/92 98.5 °F (36.9 °C) Oral 81 22 95 % 5' (1.524 m) (!) 324 lb 8.3 oz (147.2 kg)       Physical Exam  Vitals and nursing note reviewed. Constitutional:       General: She is not in acute distress. Appearance: She is well-developed. HENT:      Head: Normocephalic and atraumatic. Cardiovascular:      Pulses: Normal pulses. Pulmonary:      Effort: Pulmonary effort is normal. No respiratory distress. Musculoskeletal:         General: Normal range of motion. Cervical back: Neck supple. Comments: Body habitus limits exam but the patient does have tenderness along the medial aspect of the left knee without effusion. She has full range of motion of the knee without ligamentous laxity. No erythema or warmth. Skin:     General: Skin is warm and dry. Neurological:      Mental Status: She is alert and oriented to person, place, and time. Comments: Good range of motion of the left lower extremity with intact strength and sensation   Psychiatric:         Behavior: Behavior normal.            DIAGNOSTIC RESULTS     When ordered, EKGs are interpreted by the Emergency Department Physician in the absence of a cardiologist. Please see their note for interpretation of EKG    RADIOLOGY:         Interpretation per the Radiologist below, if available at the time of this note:    XR KNEE LEFT (1-2 VIEWS)   Final Result   1. Moderate tricompartmental osteoarthropathy. 2. No evidence of fracture. LABS:  Labs Reviewed - No data to display    When ordered, only abnormal lab results are displayed. All other labs are within normal range or not returned as of the dictation.     EMERGENCY DEPARTMENT COURSE and DIFFERENTIAL DIAGNOSIS/MDM:   Vitals:    Vitals:    11/10/21 1218   BP: (!) 149/92   Pulse: 81   Resp: 22   Temp: 98.5 °F (36.9 ARANZA  11/10/21 1310

## 2021-11-22 ENCOUNTER — TELEPHONE (OUTPATIENT)
Dept: FAMILY MEDICINE CLINIC | Age: 41
End: 2021-11-22

## 2021-12-13 ENCOUNTER — TELEPHONE (OUTPATIENT)
Dept: FAMILY MEDICINE CLINIC | Age: 41
End: 2021-12-13

## 2021-12-13 ENCOUNTER — TELEPHONE (OUTPATIENT)
Dept: PSYCHIATRY | Age: 41
End: 2021-12-13

## 2021-12-13 NOTE — TELEPHONE ENCOUNTER
[phone]    Patient is calling about an appt she has on the 16th. She is wanting to keep the appt and switch it over to a VV because she is in the hospital. She states that she is needing to get her meds refills. Please advise if this is possible.

## 2021-12-13 NOTE — TELEPHONE ENCOUNTER
Josue from [facility] requesting a copy of patient's current med list and most recent office note. Fax# [phone].

## 2021-12-13 NOTE — TELEPHONE ENCOUNTER
Lvm informing pt. Pt states she is in the hospital. Need to know which hospital. And I am scheduling in feb/march at this time. please have pt schedule next available appt.

## 2021-12-15 ENCOUNTER — CLINICAL DOCUMENTATION (OUTPATIENT)
Dept: OTHER | Age: 41
End: 2021-12-15

## 2021-12-16 ENCOUNTER — OFFICE VISIT (OUTPATIENT)
Dept: PSYCHIATRY | Age: 41
End: 2021-12-16
Payer: MEDICAID

## 2021-12-16 DIAGNOSIS — F43.10 PTSD (POST-TRAUMATIC STRESS DISORDER): Primary | ICD-10-CM

## 2021-12-16 DIAGNOSIS — E78.5 HYPERLIPIDEMIA, UNSPECIFIED HYPERLIPIDEMIA TYPE: ICD-10-CM

## 2021-12-16 DIAGNOSIS — F39 MOOD DISORDER (HCC): ICD-10-CM

## 2021-12-16 DIAGNOSIS — R73.9 HYPERGLYCEMIA: ICD-10-CM

## 2021-12-16 PROCEDURE — 99442 PR PHYS/QHP TELEPHONE EVALUATION 11-20 MIN: CPT | Performed by: PSYCHIATRY & NEUROLOGY

## 2021-12-16 RX ORDER — ZIPRASIDONE HYDROCHLORIDE 40 MG/1
CAPSULE ORAL
Qty: 60 CAPSULE | Refills: 5 | Status: SHIPPED | OUTPATIENT
Start: 2021-12-16 | End: 2022-03-14 | Stop reason: SDUPTHER

## 2021-12-16 RX ORDER — DESVENLAFAXINE 50 MG/1
50 TABLET, EXTENDED RELEASE ORAL DAILY
Qty: 90 TABLET | Refills: 1 | Status: SHIPPED | OUTPATIENT
Start: 2021-12-16 | End: 2022-03-14 | Stop reason: SDUPTHER

## 2021-12-16 NOTE — PROGRESS NOTES
Psych Follow Up Progress Note    12/16/21  Liberty Morejon  <[id number]>    I have reviewed recent documentation:  Liberty Morejon is a 46 y.o. female  This patient  has a past medical history of Anxiety, Asthma, Bipolar affective ([address] Depression, Fracture of nasal bone, GERD (gastroesophageal reflux disease), Hypothyroidism, Influenza A, Recurrent upper respiratory infection (URI), and Tinnitus. Chief Complaint   Patient presents with    Anxiety     I called this pt today because of concerns about her health and coronavirus. Subjective/Interval Hx:  Got real sick and ended up over at [facility]. \"I was basically in a coma for 3 days. \"  Now out and feeling weak. And SOB. Before all this, mood and anxiety weren't too great. Has now been 5 months sober, 2 months sober smoking. And notices that she's less irritable. But on the bad side, has nasty anxiety that she'll see someone from her old life who will offer her something. Has been basically taking desvenlafaxine only since getting out of hospital.  Nightmares not too bad. Location:  [facility]  Severity:  Mild-mod  Context:  As above. Modifiers:  Sobriety helps  Quality:  Anxiety, mood instability. Objective: There were no vitals filed for this visit. There is no height or weight on file to calculate BMI. No results found for this or any previous visit (from the past 168 hour(s)). Current Outpatient Medications   Medication Sig Dispense Refill    acetaminophen (APAP EXTRA STRENGTH) 500 MG tablet Take 2 tablets by mouth every 6 hours as needed for Pain 40 tablet 0    ziprasidone (GEODON) 40 MG capsule 1 tab nightly WITH FOOD for 2 weeks, then 2 tabs nightly WITH FOOD.  60 capsule 3    ARIPiprazole (ABILIFY) 15 MG tablet Take 1 tablet by mouth daily 90 tablet 1    desvenlafaxine succinate (PRISTIQ) 50 MG TB24 extended release tablet Take 1 tablet by mouth daily 90 tablet 1    amitriptyline (ELAVIL) 50 MG tablet Take 1 tablet by mouth nightly 30 tablet 5    L-Methylfolate 15 MG TABS Take 15 mg by mouth daily 90 tablet 1    levothyroxine (SYNTHROID) 50 MCG tablet Take 1 tablet by mouth Daily 30 tablet 2    omeprazole (PRILOSEC) 40 MG delayed release capsule TAKE ONE CAPSULE BY MOUTH EVERY MORNING BEFORE BREAKFAST 30 capsule 4    naproxen (NAPROSYN) 500 MG tablet Take 1 tablet by mouth 2 times daily 60 tablet 0    ranitidine (ZANTAC) 300 MG tablet Take 1 tablet by mouth nightly (Patient not taking: Reported on 6/16/2020) 30 tablet 3    Fluticasone-Salmeterol 113-14 MCG/ACT AEPB Inhale 1 puff into the lungs 2 times daily 1 each 5    budesonide-formoterol (SYMBICORT) 160-4.5 MCG/ACT AERO Inhale 2 puffs into the lungs 2 times daily 1 Inhaler 5    ondansetron (ZOFRAN ODT) 4 MG disintegrating tablet Take 1 tablet by mouth every 8 hours as needed for Nausea or Vomiting 20 tablet 0    albuterol sulfate HFA (PROAIR HFA) 108 (90 Base) MCG/ACT inhaler Inhale 2 puffs into the lungs every 4 hours as needed for Wheezing 1 Inhaler 0     No current facility-administered medications for this visit. 40 y.o.      1.  Mood d/o NOS, Anxiety/PTSD-Stable. Cont pristiq 50, and we'll restart geodon 40 mg x 2 weeks, then 80 qhs c food.   R/b/a d/w pt including metabolic trouble, DM, cholesterol, dyskinesia, etc.  We'll see about PTSD clinic in the future. I've referred pt to [address] [place] [address] for Stress.     I have reviewed pt's genesight, she has a number of metabolic issues and MTHFR deficiency.     2.  Drug use d/o, severe-f/u at [facility].  On methadone for now.       3.  MTHFR deficiency-couldn't afford deplin.     On abilify, zyprexa, tegretol, wellbutrin, celexa, klonopin, hydroxyzine, lamictal, trazodone in past.          I have spent >12 mins with this patient on working on coping skills and med mgt, and > 1/2 of that time was spent counseling this pt.

## 2022-03-14 ENCOUNTER — OFFICE VISIT (OUTPATIENT)
Dept: PSYCHIATRY | Age: 42
End: 2022-03-14
Payer: MEDICAID

## 2022-03-14 VITALS
BODY MASS INDEX: 57.52 KG/M2 | HEART RATE: 124 BPM | SYSTOLIC BLOOD PRESSURE: 132 MMHG | OXYGEN SATURATION: 91 % | HEIGHT: 60 IN | DIASTOLIC BLOOD PRESSURE: 80 MMHG | WEIGHT: 293 LBS

## 2022-03-14 DIAGNOSIS — F43.10 PTSD (POST-TRAUMATIC STRESS DISORDER): Primary | ICD-10-CM

## 2022-03-14 PROCEDURE — G8484 FLU IMMUNIZE NO ADMIN: HCPCS | Performed by: PSYCHIATRY & NEUROLOGY

## 2022-03-14 PROCEDURE — 4004F PT TOBACCO SCREEN RCVD TLK: CPT | Performed by: PSYCHIATRY & NEUROLOGY

## 2022-03-14 PROCEDURE — 99214 OFFICE O/P EST MOD 30 MIN: CPT | Performed by: PSYCHIATRY & NEUROLOGY

## 2022-03-14 PROCEDURE — G8417 CALC BMI ABV UP PARAM F/U: HCPCS | Performed by: PSYCHIATRY & NEUROLOGY

## 2022-03-14 PROCEDURE — G8428 CUR MEDS NOT DOCUMENT: HCPCS | Performed by: PSYCHIATRY & NEUROLOGY

## 2022-03-14 RX ORDER — ZIPRASIDONE HYDROCHLORIDE 40 MG/1
CAPSULE ORAL
Qty: 60 CAPSULE | Refills: 5 | Status: SHIPPED | OUTPATIENT
Start: 2022-03-14

## 2022-03-14 RX ORDER — AMITRIPTYLINE HYDROCHLORIDE 50 MG/1
50 TABLET, FILM COATED ORAL NIGHTLY
Qty: 30 TABLET | Refills: 5 | Status: SHIPPED | OUTPATIENT
Start: 2022-03-14

## 2022-03-14 RX ORDER — DESVENLAFAXINE 50 MG/1
50 TABLET, EXTENDED RELEASE ORAL DAILY
Qty: 90 TABLET | Refills: 1 | Status: SHIPPED | OUTPATIENT
Start: 2022-03-14

## 2022-03-14 NOTE — PROGRESS NOTES
Psych Follow Up Progress Note    3/14/22  Rylee Ledesma  <[id number]>    I have reviewed recent documentation:  Rylee Ledesma is a 46 y.o. female  This patient  has a past medical history of Anxiety, Asthma, Bipolar affective ([address] Depression, Fracture of nasal bone, GERD (gastroesophageal reflux disease), Hypothyroidism, Influenza A, Recurrent upper respiratory infection (URI), and Tinnitus. Chief Complaint   Patient presents with    Anxiety     Subjective/Interval Hx:  Not feeling great. Feels like she really needs weight loss surgery, but has been having breathing issues. And this doesn't help energy or drive, of course. Isolating a lot in her own room. In her bed. Very disappointed that I'm moving jobs. And not sleeping well, even with geodon. Nightmares not bad, but thoughts during the day are so anxiety-provoking. Seen now by [place] dept of disability determination. Had a nasty spell of something where she ended up over at , and had bad liver injury. Turns out it seems pt wasn't taking amitrip. Location:  [facility]  Severity:  mod  Context:  As above. Modifiers:  meds  Quality:  Anxiety. Objective:  Vitals:    03/14/22 1318   BP: 132/80   Pulse: 124   SpO2: 91%   Weight: (!) 333 lb (151 kg)   Height: 5' (1.524 m)     Body mass index is 65.03 kg/m². No results found for this or any previous visit (from the past 168 hour(s)). Current Outpatient Medications   Medication Sig Dispense Refill    desvenlafaxine succinate (PRISTIQ) 50 MG TB24 extended release tablet Take 1 tablet by mouth daily 90 tablet 1    ziprasidone (GEODON) 40 MG capsule 1 tab nightly WITH FOOD for 2 weeks, then 2 tabs nightly WITH FOOD.  60 capsule 5    acetaminophen (APAP EXTRA STRENGTH) 500 MG tablet Take 2 tablets by mouth every 6 hours as needed for Pain 40 tablet 0    amitriptyline (ELAVIL) 50 MG tablet Take 1 tablet by mouth nightly 30 tablet 5    L-Methylfolate 15 MG TABS Take 15 mg by mouth daily 90 tablet 1    levothyroxine (SYNTHROID) 50 MCG tablet Take 1 tablet by mouth Daily 30 tablet 2    omeprazole (PRILOSEC) 40 MG delayed release capsule TAKE ONE CAPSULE BY MOUTH EVERY MORNING BEFORE BREAKFAST 30 capsule 4    naproxen (NAPROSYN) 500 MG tablet Take 1 tablet by mouth 2 times daily 60 tablet 0    ranitidine (ZANTAC) 300 MG tablet Take 1 tablet by mouth nightly (Patient not taking: Reported on 6/16/2020) 30 tablet 3    Fluticasone-Salmeterol 113-14 MCG/ACT AEPB Inhale 1 puff into the lungs 2 times daily 1 each 5    budesonide-formoterol (SYMBICORT) 160-4.5 MCG/ACT AERO Inhale 2 puffs into the lungs 2 times daily 1 Inhaler 5    ondansetron (ZOFRAN ODT) 4 MG disintegrating tablet Take 1 tablet by mouth every 8 hours as needed for Nausea or Vomiting 20 tablet 0    albuterol sulfate HFA (PROAIR HFA) 108 (90 Base) MCG/ACT inhaler Inhale 2 puffs into the lungs every 4 hours as needed for Wheezing 1 Inhaler 0     No current facility-administered medications for this visit. ROS:  No tremor, gait nl    MSE:    A-causally dressed, good EC, pleasant and engageable  A-full to mild distressed  M-anxious  S-Grossly  A + O  I/J-fair/fair  T-linear, goal-directed. Speech c some dec tone at times as she has trouble breathing. No S/H I, no A/v H.        41 y.o.      1.  Mood d/o NOS, Anxiety/PTSD-Not that great.  Cont pristiq 50, and Geodon 40-80  c food.   We'll try amitrip 50 qhs.  R/b/a d/w pt including metabolic trouble, DM, cholesterol, dyskinesia, etc.  We'll see about PTSD clinic in the future.  I've referred pt to [address] [place] [address] for Stress.     I have reviewed pt's genesight, she has a number of metabolic issues and MTHFR deficiency.     2.  Drug use d/o, severe-f/u at [facility].  On methadone for now.       3.  MTHFR deficiency-couldn't afford deplin.     On abilify, zyprexa, tegretol, wellbutrin, celexa, klonopin, hydroxyzine, lamictal, trazodone in past.      I have spent >25 mins with this patient on working on coping skills and med mgt, and > 1/2 of that time was spent counseling this pt.

## 2023-03-30 ENCOUNTER — APPOINTMENT (OUTPATIENT)
Dept: GENERAL RADIOLOGY | Age: 43
End: 2023-03-30
Payer: MEDICAID

## 2023-03-30 ENCOUNTER — HOSPITAL ENCOUNTER (EMERGENCY)
Age: 43
Discharge: HOME | End: 2023-03-30
Attending: EMERGENCY MEDICINE
Payer: MEDICAID

## 2023-03-30 VITALS
OXYGEN SATURATION: 95 % | HEART RATE: 96 BPM | SYSTOLIC BLOOD PRESSURE: 117 MMHG | BODY MASS INDEX: 40.75 KG/M2 | DIASTOLIC BLOOD PRESSURE: 74 MMHG | HEIGHT: 61 IN | WEIGHT: 215.83 LBS | TEMPERATURE: 98.3 F | RESPIRATION RATE: 22 BRPM

## 2023-03-30 DIAGNOSIS — J44.1 COPD EXACERBATION (HCC): Primary | ICD-10-CM

## 2023-03-30 DIAGNOSIS — J40 BRONCHITIS: ICD-10-CM

## 2023-03-30 LAB
ALBUMIN SERPL-MCNC: 4.1 G/DL (ref 3.4–5)
ALBUMIN/GLOB SERPL: 1.1 {RATIO} (ref 1.1–2.2)
ALP SERPL-CCNC: 96 U/L (ref 40–129)
ALT SERPL-CCNC: 15 U/L (ref 10–40)
ANION GAP SERPL CALCULATED.3IONS-SCNC: 13 MMOL/L (ref 3–16)
AST SERPL-CCNC: 30 U/L (ref 15–37)
BASOPHILS # BLD: 0 K/UL (ref 0–0.2)
BASOPHILS NFR BLD: 0.5 %
BILIRUB SERPL-MCNC: 0.4 MG/DL (ref 0–1)
BUN SERPL-MCNC: 9 MG/DL (ref 7–20)
CALCIUM SERPL-MCNC: 9.9 MG/DL (ref 8.3–10.6)
CHLORIDE SERPL-SCNC: 96 MMOL/L (ref 99–110)
CO2 SERPL-SCNC: 32 MMOL/L (ref 21–32)
CREAT SERPL-MCNC: <0.5 MG/DL (ref 0.6–1.1)
DEPRECATED RDW RBC AUTO: 14.9 % (ref 12.4–15.4)
EOSINOPHIL # BLD: 0 K/UL (ref 0–0.6)
EOSINOPHIL NFR BLD: 0.5 %
GFR SERPLBLD CREATININE-BSD FMLA CKD-EPI: >60 ML/MIN/{1.73_M2}
GLUCOSE SERPL-MCNC: 77 MG/DL (ref 70–99)
HCT VFR BLD AUTO: 48 % (ref 36–48)
HGB BLD-MCNC: 16.2 G/DL (ref 12–16)
LYMPHOCYTES # BLD: 3 K/UL (ref 1–5.1)
LYMPHOCYTES NFR BLD: 36.8 %
MCH RBC QN AUTO: 30.4 PG (ref 26–34)
MCHC RBC AUTO-ENTMCNC: 33.9 G/DL (ref 31–36)
MCV RBC AUTO: 89.7 FL (ref 80–100)
MONOCYTES # BLD: 0.6 K/UL (ref 0–1.3)
MONOCYTES NFR BLD: 7.3 %
NEUTROPHILS # BLD: 4.4 K/UL (ref 1.7–7.7)
NEUTROPHILS NFR BLD: 54.9 %
NT-PROBNP SERPL-MCNC: 102 PG/ML (ref 0–124)
PLATELET # BLD AUTO: 201 K/UL (ref 135–450)
PMV BLD AUTO: 7.9 FL (ref 5–10.5)
POTASSIUM SERPL-SCNC: 4.1 MMOL/L (ref 3.5–5.1)
PROT SERPL-MCNC: 7.8 G/DL (ref 6.4–8.2)
RBC # BLD AUTO: 5.35 M/UL (ref 4–5.2)
SARS-COV-2 RDRP RESP QL NAA+PROBE: NOT DETECTED
SODIUM SERPL-SCNC: 141 MMOL/L (ref 136–145)
TROPONIN T SERPL-MCNC: <0.01 NG/ML
WBC # BLD AUTO: 8.1 K/UL (ref 4–11)

## 2023-03-30 PROCEDURE — 96374 THER/PROPH/DIAG INJ IV PUSH: CPT

## 2023-03-30 PROCEDURE — 99285 EMERGENCY DEPT VISIT HI MDM: CPT

## 2023-03-30 PROCEDURE — 6370000000 HC RX 637 (ALT 250 FOR IP): Performed by: EMERGENCY MEDICINE

## 2023-03-30 PROCEDURE — 36415 COLL VENOUS BLD VENIPUNCTURE: CPT

## 2023-03-30 PROCEDURE — 80053 COMPREHEN METABOLIC PANEL: CPT

## 2023-03-30 PROCEDURE — 6360000002 HC RX W HCPCS: Performed by: EMERGENCY MEDICINE

## 2023-03-30 PROCEDURE — 85025 COMPLETE CBC W/AUTO DIFF WBC: CPT

## 2023-03-30 PROCEDURE — 93005 ELECTROCARDIOGRAM TRACING: CPT | Performed by: EMERGENCY MEDICINE

## 2023-03-30 PROCEDURE — 71046 X-RAY EXAM CHEST 2 VIEWS: CPT

## 2023-03-30 PROCEDURE — 83880 ASSAY OF NATRIURETIC PEPTIDE: CPT

## 2023-03-30 PROCEDURE — 84484 ASSAY OF TROPONIN QUANT: CPT

## 2023-03-30 PROCEDURE — 87635 SARS-COV-2 COVID-19 AMP PRB: CPT

## 2023-03-30 RX ORDER — IPRATROPIUM BROMIDE AND ALBUTEROL SULFATE 2.5; .5 MG/3ML; MG/3ML
1 SOLUTION RESPIRATORY (INHALATION) ONCE
Status: COMPLETED | OUTPATIENT
Start: 2023-03-30 | End: 2023-03-30

## 2023-03-30 RX ORDER — BENZONATATE 200 MG/1
200 CAPSULE ORAL 3 TIMES DAILY PRN
Qty: 30 CAPSULE | Refills: 0 | Status: SHIPPED | OUTPATIENT
Start: 2023-03-30 | End: 2023-04-06

## 2023-03-30 RX ORDER — CETIRIZINE HYDROCHLORIDE 10 MG/1
10 TABLET ORAL DAILY
COMMUNITY

## 2023-03-30 RX ORDER — METHYLPREDNISOLONE 4 MG/1
TABLET ORAL
Qty: 1 KIT | Refills: 0 | Status: SHIPPED | OUTPATIENT
Start: 2023-03-31 | End: 2023-04-05

## 2023-03-30 RX ORDER — TRAZODONE HYDROCHLORIDE 100 MG/1
100 TABLET ORAL NIGHTLY
COMMUNITY

## 2023-03-30 RX ORDER — FERROUS SULFATE 325(65) MG
325 TABLET ORAL
COMMUNITY

## 2023-03-30 RX ORDER — PRAMIPEXOLE DIHYDROCHLORIDE 0.25 MG/1
0.25 TABLET ORAL NIGHTLY PRN
COMMUNITY

## 2023-03-30 RX ORDER — MONTELUKAST SODIUM 10 MG/1
10 TABLET ORAL NIGHTLY
COMMUNITY

## 2023-03-30 RX ORDER — GABAPENTIN 600 MG/1
600 TABLET ORAL 3 TIMES DAILY
COMMUNITY

## 2023-03-30 RX ORDER — ALBUTEROL SULFATE 90 UG/1
2 AEROSOL, METERED RESPIRATORY (INHALATION) EVERY 4 HOURS PRN
Qty: 1 EACH | Refills: 0 | Status: SHIPPED | OUTPATIENT
Start: 2023-03-30

## 2023-03-30 RX ORDER — CYCLOBENZAPRINE HCL 10 MG
10 TABLET ORAL 3 TIMES DAILY PRN
COMMUNITY

## 2023-03-30 RX ORDER — LIDOCAINE 4 G/G
1 PATCH TOPICAL DAILY
COMMUNITY

## 2023-03-30 RX ORDER — LEVOTHYROXINE SODIUM 0.07 MG/1
75 TABLET ORAL DAILY
COMMUNITY

## 2023-03-30 RX ORDER — METHYLPREDNISOLONE SODIUM SUCCINATE 125 MG/2ML
125 INJECTION, POWDER, LYOPHILIZED, FOR SOLUTION INTRAMUSCULAR; INTRAVENOUS ONCE
Status: COMPLETED | OUTPATIENT
Start: 2023-03-30 | End: 2023-03-30

## 2023-03-30 RX ORDER — PRAZOSIN HYDROCHLORIDE 1 MG/1
1 CAPSULE ORAL NIGHTLY
COMMUNITY

## 2023-03-30 RX ADMIN — METHYLPREDNISOLONE SODIUM SUCCINATE 125 MG: 125 INJECTION, POWDER, FOR SOLUTION INTRAMUSCULAR; INTRAVENOUS at 20:05

## 2023-03-30 RX ADMIN — IPRATROPIUM BROMIDE AND ALBUTEROL SULFATE 1 AMPULE: 2.5; .5 SOLUTION RESPIRATORY (INHALATION) at 19:32

## 2023-03-30 NOTE — ED NOTES
Lung sounds diminished with exp wheezes throughout.   Pulse 94  O2 sat 94%/3 liters  RR 22  HHN's begun     Diamante Clarke RN  03/30/23 1940

## 2023-03-30 NOTE — ED NOTES
HHN continues. Pulse 88 O2 sat 99%  RR 22  Lung sounds little diminished with very few wheezes.         Sandee Krishna, MARIAM  03/30/23 1940

## 2023-03-31 LAB
EKG ATRIAL RATE: 83 BPM
EKG DIAGNOSIS: NORMAL
EKG P AXIS: 70 DEGREES
EKG P-R INTERVAL: 164 MS
EKG Q-T INTERVAL: 362 MS
EKG QRS DURATION: 78 MS
EKG QTC CALCULATION (BAZETT): 425 MS
EKG R AXIS: 30 DEGREES
EKG T AXIS: 54 DEGREES
EKG VENTRICULAR RATE: 83 BPM

## 2023-03-31 PROCEDURE — 93010 ELECTROCARDIOGRAM REPORT: CPT | Performed by: INTERNAL MEDICINE

## 2023-03-31 NOTE — ED NOTES
HHN finished. Lung sounds less diminished overall-insp/exp wheezes noted. Pt reports feeling better.   Pulse 92  O2 sat 96%  RR [id number] [facility]  03/30/23 2027

## 2023-03-31 NOTE — ED PROVIDER NOTES
Vaping Use: Never used   Substance Use Topics    Alcohol use: Yes     Comment: occ beer    Drug use: Not Currently       SCREENINGS                         CIWA Assessment  BP: 117/74  Heart Rate: 96           PHYSICAL EXAM  1 or more Elements     ED Triage Vitals [23 1845]   BP Temp Temp Source Heart Rate Resp SpO2 Height Weight   127/88 98.3 °F (36.8 °C) Oral 94 22 97 % 5' 1\" (1.549 m) 215 lb 13.3 oz (97.9 kg)       Physical Exam    My pulse oximetry interpretation is which is within the normal range on 3 L nasal cannula    GENERAL APPEARANCE: Awake and alert. Cooperative. No acute distress. HEAD:  Atraumatic. EYES: EOM's grossly intact. ENT: Mucous membranes are moist.  No trismus. NECK:  Trachea midline. HEART: Regular rate and rhythm  LUNGS: Respirations unlabored. Speaking in full sentences. Diffuse wheezing throughout lung fields  ABDOMEN: Soft. Non-tender. No guarding or rebound. EXTREMITIES: No acute deformities. SKIN: Warm and dry. NEUROLOGICAL: Moves all 4 extremities spontaneously. PSYCHIATRIC: Normal mood. DIAGNOSTIC RESULTS   LABS:    Labs Reviewed   CBC WITH AUTO DIFFERENTIAL - Abnormal; Notable for the following components:       Result Value    RBC 5.35 (*)     Hemoglobin 16.2 (*)     All other components within normal limits   COMPREHENSIVE METABOLIC PANEL W/ REFLEX TO MG FOR LOW K - Abnormal; Notable for the following components:    Chloride 96 (*)     Creatinine <0.5 (*)     All other components within normal limits   COVID-19, RAPID   BRAIN NATRIURETIC PEPTIDE   TROPONIN       When ordered only abnormal lab results are displayed. All other labs were within normal range or not returned as of this dictation. EK lead EKG:  Normal Sinus Rhythm 83  Axis is   Normal  QTc is  normal  There is no specific ST-T wave changes appreciated. There is no clear evidence of acute ischemia or infarction. It was compared against an EKG from none.       RADIOLOGY:   Non-plain film images such as CT, Ultrasound and MRI are read by the radiologist. Plain radiographic images are visualized and preliminarily interpreted by the ED Provider with the below findings:        Interpretation per the Radiologist below, if available at the time of this note:    XR CHEST (2 VW)   Final Result   No acute process. XR CHEST (2 VW)    Result Date: 3/30/2023  EXAMINATION: TWO XRAY VIEWS OF THE CHEST 3/30/2023 7:27 pm COMPARISON: 02/24/2019 HISTORY: ORDERING SYSTEM PROVIDED HISTORY: wheezing TECHNOLOGIST PROVIDED HISTORY: Reason for exam:->wheezing Reason for Exam: SOB FINDINGS: The lungs are without acute focal process. There is no effusion or pneumothorax. The cardiomediastinal silhouette is stable. The osseous structures are stable. No acute process. No results found.     PROCEDURES   Unless otherwise noted below, none     Procedures    CRITICAL CARE TIME       EMERGENCY DEPARTMENT COURSE and DIFFERENTIAL DIAGNOSIS/MDM:   Vitals:    Vitals:    03/30/23 1900 03/30/23 1930 03/30/23 2000 03/30/23 2030   BP: 135/82 (!) 132/93 124/84 117/74   Pulse: 98 95 (!) 103 96   Resp: 22 22 22 22   Temp:       TempSrc:       SpO2: 95% 97% 99% 95%   Weight:       Height:           Patient was given the following medications:  Medications   ipratropium-albuterol (DUONEB) nebulizer solution 1 ampule (1 ampule Inhalation Given 3/30/23 1932)   ipratropium-albuterol (DUONEB) nebulizer solution 1 ampule (1 ampule Inhalation Given 3/30/23 1932)   methylPREDNISolone sodium (SOLU-MEDROL) injection 125 mg (125 mg IntraVENous Given 3/30/23 2005)          PAST MEDICAL HISTORY      has a past medical history of Anxiety, Asthma, Bipolar affective ([address] COPD (chronic obstructive pulmonary disease) ([address] Depression, Fracture of nasal bone, GERD (gastroesophageal reflux disease), Hypothyroidism, Influenza A (02/24/2019), On home O2, Recurrent upper respiratory infection (URI), Restless leg syndrome, Sleep apnea, and

## 2023-06-28 ENCOUNTER — HOSPITAL ENCOUNTER (OUTPATIENT)
Dept: MAMMOGRAPHY | Age: 43
Discharge: HOME | End: 2023-06-28
Payer: MEDICAID

## 2023-06-28 VITALS — HEIGHT: 60 IN | WEIGHT: 212 LBS | BODY MASS INDEX: 41.62 KG/M2

## 2023-06-28 DIAGNOSIS — Z12.31 VISIT FOR SCREENING MAMMOGRAM: ICD-10-CM

## 2023-06-28 PROCEDURE — 77063 BREAST TOMOSYNTHESIS BI: CPT

## 2024-07-17 NOTE — ED TRIAGE NOTES
Patient with pain in left leg, from knee radiating down shin to foot. Good pulses and perfusion. Patient noticed the pain following hanging up laundry Monday. VSS. A/O x4. Ambulated self without assistance back to bed. Yes